# Patient Record
Sex: FEMALE | Race: WHITE | Employment: OTHER | URBAN - METROPOLITAN AREA
[De-identification: names, ages, dates, MRNs, and addresses within clinical notes are randomized per-mention and may not be internally consistent; named-entity substitution may affect disease eponyms.]

---

## 2018-06-19 ENCOUNTER — HOSPITAL ENCOUNTER (OUTPATIENT)
Age: 68
Discharge: HOME | End: 2018-06-21

## 2018-06-19 LAB
ANION GAP SERPL CALCULATED.3IONS-SCNC: 13 MMOL/L (ref 7–16)
BUN BLDV-MCNC: 10 MG/DL (ref 8–23)
CALCIUM SERPL-MCNC: 8.7 MG/DL (ref 8.6–10.2)
CHLORIDE BLD-SCNC: 101 MMOL/L (ref 98–107)
CO2: 26 MMOL/L (ref 22–29)
CREAT SERPL-MCNC: 0.7 MG/DL (ref 0.5–1)
GFR AFRICAN AMERICAN: >60
GFR NON-AFRICAN AMERICAN: >60 ML/MIN/1.73
GLUCOSE BLD-MCNC: 106 MG/DL (ref 74–109)
HCT VFR BLD CALC: 39.7 % (ref 34–48)
HEMOGLOBIN: 12.5 G/DL (ref 11.5–15.5)
MCH RBC QN AUTO: 28.7 PG (ref 26–35)
MCHC RBC AUTO-ENTMCNC: 31.5 % (ref 32–34.5)
MCV RBC AUTO: 91.3 FL (ref 80–99.9)
PDW BLD-RTO: 12.8 FL (ref 11.5–15)
PLATELET # BLD: 240 E9/L (ref 130–450)
PMV BLD AUTO: 11 FL (ref 7–12)
POTASSIUM SERPL-SCNC: 4 MMOL/L (ref 3.5–5)
RBC # BLD: 4.35 E12/L (ref 3.5–5.5)
SODIUM BLD-SCNC: 140 MMOL/L (ref 132–146)
WBC # BLD: 5.6 E9/L (ref 4.5–11.5)

## 2018-06-19 PROCEDURE — 80048 BASIC METABOLIC PNL TOTAL CA: CPT

## 2018-06-19 PROCEDURE — 87081 CULTURE SCREEN ONLY: CPT

## 2018-06-19 PROCEDURE — 85027 COMPLETE CBC AUTOMATED: CPT

## 2018-06-21 LAB — MRSA CULTURE ONLY: NORMAL

## 2019-06-10 ENCOUNTER — OFFICE VISIT (OUTPATIENT)
Dept: RHEUMATOLOGY | Age: 69
End: 2019-06-10
Payer: MEDICARE

## 2019-06-10 ENCOUNTER — HOSPITAL ENCOUNTER (OUTPATIENT)
Age: 69
Discharge: HOME | End: 2019-06-12
Payer: MEDICARE

## 2019-06-10 VITALS
BODY MASS INDEX: 34.72 KG/M2 | DIASTOLIC BLOOD PRESSURE: 74 MMHG | WEIGHT: 203.4 LBS | SYSTOLIC BLOOD PRESSURE: 124 MMHG | OXYGEN SATURATION: 96 % | HEIGHT: 64 IN | TEMPERATURE: 97.2 F | HEART RATE: 79 BPM

## 2019-06-10 DIAGNOSIS — M35.00 HX OF SJOGREN'S DISEASE (HCC): Primary | ICD-10-CM

## 2019-06-10 DIAGNOSIS — M35.00 HX OF SJOGREN'S DISEASE (HCC): ICD-10-CM

## 2019-06-10 DIAGNOSIS — M79.7 FIBROMYALGIA: ICD-10-CM

## 2019-06-10 LAB
ALBUMIN SERPL-MCNC: 4.5 G/DL (ref 3.5–5.2)
ALP BLD-CCNC: 88 U/L (ref 35–104)
ALT SERPL-CCNC: 13 U/L (ref 0–32)
ANION GAP SERPL CALCULATED.3IONS-SCNC: 18 MMOL/L (ref 7–16)
AST SERPL-CCNC: 15 U/L (ref 0–31)
BACTERIA: ABNORMAL /HPF
BASOPHILS ABSOLUTE: 0.03 E9/L (ref 0–0.2)
BASOPHILS RELATIVE PERCENT: 0.5 % (ref 0–2)
BILIRUB SERPL-MCNC: 0.3 MG/DL (ref 0–1.2)
BILIRUBIN URINE: NEGATIVE
BLOOD, URINE: ABNORMAL
BUN BLDV-MCNC: 12 MG/DL (ref 8–23)
C-REACTIVE PROTEIN: 0.4 MG/DL (ref 0–0.4)
C3 COMPLEMENT: 159 MG/DL (ref 90–180)
C4 COMPLEMENT: 25 MG/DL (ref 10–40)
CALCIUM SERPL-MCNC: 9.3 MG/DL (ref 8.6–10.2)
CHLORIDE BLD-SCNC: 103 MMOL/L (ref 98–107)
CLARITY: ABNORMAL
CO2: 21 MMOL/L (ref 22–29)
COLOR: YELLOW
CREAT SERPL-MCNC: 0.7 MG/DL (ref 0.5–1)
CREATININE URINE: 179 MG/DL (ref 29–226)
EOSINOPHILS ABSOLUTE: 0.06 E9/L (ref 0.05–0.5)
EOSINOPHILS RELATIVE PERCENT: 0.9 % (ref 0–6)
GFR AFRICAN AMERICAN: >60
GFR NON-AFRICAN AMERICAN: >60 ML/MIN/1.73
GLUCOSE BLD-MCNC: 99 MG/DL (ref 74–99)
GLUCOSE URINE: NEGATIVE MG/DL
HCT VFR BLD CALC: 42.5 % (ref 34–48)
HEMOGLOBIN: 13.3 G/DL (ref 11.5–15.5)
IMMATURE GRANULOCYTES #: 0.03 E9/L
IMMATURE GRANULOCYTES %: 0.5 % (ref 0–5)
KETONES, URINE: NEGATIVE MG/DL
LEUKOCYTE ESTERASE, URINE: ABNORMAL
LYMPHOCYTES ABSOLUTE: 1.46 E9/L (ref 1.5–4)
LYMPHOCYTES RELATIVE PERCENT: 22.4 % (ref 20–42)
MCH RBC QN AUTO: 28.1 PG (ref 26–35)
MCHC RBC AUTO-ENTMCNC: 31.3 % (ref 32–34.5)
MCV RBC AUTO: 89.7 FL (ref 80–99.9)
MONOCYTES ABSOLUTE: 0.35 E9/L (ref 0.1–0.95)
MONOCYTES RELATIVE PERCENT: 5.4 % (ref 2–12)
NEUTROPHILS ABSOLUTE: 4.58 E9/L (ref 1.8–7.3)
NEUTROPHILS RELATIVE PERCENT: 70.3 % (ref 43–80)
NITRITE, URINE: NEGATIVE
PDW BLD-RTO: 13.9 FL (ref 11.5–15)
PH UA: 5.5 (ref 5–9)
PLATELET # BLD: 246 E9/L (ref 130–450)
PMV BLD AUTO: 10.6 FL (ref 7–12)
POTASSIUM SERPL-SCNC: 4.1 MMOL/L (ref 3.5–5)
PROTEIN PROTEIN: 15 MG/DL (ref 0–12)
PROTEIN UA: NEGATIVE MG/DL
PROTEIN/CREAT RATIO: 0.1
PROTEIN/CREAT RATIO: 0.1 (ref 0–0.2)
RBC # BLD: 4.74 E12/L (ref 3.5–5.5)
RBC UA: ABNORMAL /HPF (ref 0–2)
RHEUMATOID FACTOR: <10 IU/ML (ref 0–13)
SEDIMENTATION RATE, ERYTHROCYTE: 15 MM/HR (ref 0–20)
SODIUM BLD-SCNC: 142 MMOL/L (ref 132–146)
SPECIFIC GRAVITY UA: 1.02 (ref 1–1.03)
TOTAL PROTEIN: 7.6 G/DL (ref 6.4–8.3)
URIC ACID, SERUM: 4.8 MG/DL (ref 2.4–5.7)
UROBILINOGEN, URINE: 0.2 E.U./DL
WBC # BLD: 6.5 E9/L (ref 4.5–11.5)
WBC UA: ABNORMAL /HPF (ref 0–5)

## 2019-06-10 PROCEDURE — 86140 C-REACTIVE PROTEIN: CPT

## 2019-06-10 PROCEDURE — 86704 HEP B CORE ANTIBODY TOTAL: CPT

## 2019-06-10 PROCEDURE — 84156 ASSAY OF PROTEIN URINE: CPT

## 2019-06-10 PROCEDURE — 36415 COLL VENOUS BLD VENIPUNCTURE: CPT

## 2019-06-10 PROCEDURE — 86803 HEPATITIS C AB TEST: CPT

## 2019-06-10 PROCEDURE — 86200 CCP ANTIBODY: CPT

## 2019-06-10 PROCEDURE — 86706 HEP B SURFACE ANTIBODY: CPT

## 2019-06-10 PROCEDURE — 86431 RHEUMATOID FACTOR QUANT: CPT

## 2019-06-10 PROCEDURE — 80053 COMPREHEN METABOLIC PANEL: CPT

## 2019-06-10 PROCEDURE — 86235 NUCLEAR ANTIGEN ANTIBODY: CPT

## 2019-06-10 PROCEDURE — 82570 ASSAY OF URINE CREATININE: CPT

## 2019-06-10 PROCEDURE — 99203 OFFICE O/P NEW LOW 30 MIN: CPT | Performed by: INTERNAL MEDICINE

## 2019-06-10 PROCEDURE — 81001 URINALYSIS AUTO W/SCOPE: CPT

## 2019-06-10 PROCEDURE — 86225 DNA ANTIBODY NATIVE: CPT

## 2019-06-10 PROCEDURE — 87340 HEPATITIS B SURFACE AG IA: CPT

## 2019-06-10 PROCEDURE — 86255 FLUORESCENT ANTIBODY SCREEN: CPT

## 2019-06-10 PROCEDURE — 85025 COMPLETE CBC W/AUTO DIFF WBC: CPT

## 2019-06-10 PROCEDURE — 84550 ASSAY OF BLOOD/URIC ACID: CPT

## 2019-06-10 PROCEDURE — 86160 COMPLEMENT ANTIGEN: CPT

## 2019-06-10 PROCEDURE — 86038 ANTINUCLEAR ANTIBODIES: CPT

## 2019-06-10 PROCEDURE — 85651 RBC SED RATE NONAUTOMATED: CPT

## 2019-06-10 RX ORDER — RANITIDINE 300 MG/1
300 TABLET ORAL NIGHTLY
COMMUNITY
End: 2022-01-04

## 2019-06-10 RX ORDER — ACETAMINOPHEN 500 MG
1000 TABLET ORAL EVERY 6 HOURS PRN
COMMUNITY

## 2019-06-10 RX ORDER — MELOXICAM 15 MG/1
15 TABLET ORAL DAILY PRN
Qty: 30 TABLET | Refills: 2 | Status: SHIPPED | OUTPATIENT
Start: 2019-06-10 | End: 2022-01-04

## 2019-06-10 RX ORDER — IBUPROFEN 200 MG
400 TABLET ORAL EVERY 6 HOURS PRN
COMMUNITY
End: 2022-01-04

## 2019-06-10 SDOH — HEALTH STABILITY: MENTAL HEALTH: HOW OFTEN DO YOU HAVE A DRINK CONTAINING ALCOHOL?: MONTHLY OR LESS

## 2019-06-10 SDOH — HEALTH STABILITY: MENTAL HEALTH: HOW MANY STANDARD DRINKS CONTAINING ALCOHOL DO YOU HAVE ON A TYPICAL DAY?: 1 OR 2

## 2019-06-10 ASSESSMENT — ENCOUNTER SYMPTOMS
COUGH: 0
ABDOMINAL PAIN: 0
CONSTIPATION: 0
SHORTNESS OF BREATH: 0
EYE ITCHING: 0
PHOTOPHOBIA: 0
EYE REDNESS: 0
WHEEZING: 0
CHEST TIGHTNESS: 0
EYE PAIN: 0
BACK PAIN: 1
DIARRHEA: 0
BLOOD IN STOOL: 0
SORE THROAT: 0
VOMITING: 0

## 2019-06-10 ASSESSMENT — ROUTINE ASSESSMENT OF PATIENT INDEX DATA (RAPID3)
ON A SCALE OF ONE TO TEN, CONSIDERING ALL THE WAYS IN WHICH ILLNESS AND HEALTH CONDITIONS MAY AFFECT YOU AT THIS TIME, PLEASE INDICATE BELOW HOW YOU ARE DOING:: 4
ON A SCALE OF ONE TO TEN, HOW MUCH PAIN HAVE YOU HAD BECAUSE OF YOUR CONDITION OVER THE PAST WEEK?: 7.5
TOTAL RAPID3 SCORE: 11.5
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
## 2019-06-11 LAB
ANTI-NUCLEAR ANTIBODY (ANA): NEGATIVE
HBV SURFACE AB TITR SER: NORMAL {TITER}
HEPATITIS B SURFACE ANTIGEN INTERPRETATION: NORMAL
HEPATITIS C ANTIBODY INTERPRETATION: NORMAL

## 2019-06-12 ENCOUNTER — TELEPHONE (OUTPATIENT)
Dept: RHEUMATOLOGY | Age: 69
End: 2019-06-12

## 2019-06-12 DIAGNOSIS — M35.00 SJOGREN'S SYNDROME, WITH UNSPECIFIED ORGAN INVOLVEMENT (HCC): Primary | ICD-10-CM

## 2019-06-12 LAB
ANTI DNA DOUBLE STRANDED: NEGATIVE
ANTI JO-1 IGG: NEGATIVE
ANTI-MITOCHONDRIAL AB, IFA: NEGATIVE
ENA TO RNP ANTIBODY: NEGATIVE
SCLERODERMA (SCL-70) AB: NEGATIVE

## 2019-06-12 NOTE — TELEPHONE ENCOUNTER
Can you put in an order for SSB lab? Apparently the panel they ran showed this as positive but they cannot enter the results without you entering in the test order.

## 2019-06-13 LAB
CCP IGG ANTIBODIES: 4 UNITS (ref 0–19)
ENA TO SSA (RO) ANTIBODY: NEGATIVE
ENA TO SSB (LA) ANTIBODY: POSITIVE
HEPATITIS B CORE TOTAL ANTIBODY: NONREACTIVE

## 2019-07-11 ENCOUNTER — OFFICE VISIT (OUTPATIENT)
Dept: RHEUMATOLOGY | Age: 69
End: 2019-07-11
Payer: MEDICARE

## 2019-07-11 VITALS
HEART RATE: 89 BPM | OXYGEN SATURATION: 95 % | WEIGHT: 201.4 LBS | DIASTOLIC BLOOD PRESSURE: 78 MMHG | BODY MASS INDEX: 33.55 KG/M2 | TEMPERATURE: 97.2 F | HEIGHT: 65 IN | SYSTOLIC BLOOD PRESSURE: 118 MMHG

## 2019-07-11 DIAGNOSIS — N30.00 ACUTE CYSTITIS WITHOUT HEMATURIA: Primary | ICD-10-CM

## 2019-07-11 PROCEDURE — 99214 OFFICE O/P EST MOD 30 MIN: CPT | Performed by: INTERNAL MEDICINE

## 2019-07-11 RX ORDER — NITROFURANTOIN 25; 75 MG/1; MG/1
100 CAPSULE ORAL 2 TIMES DAILY
Qty: 10 CAPSULE | Refills: 0 | Status: SHIPPED | OUTPATIENT
Start: 2019-07-11 | End: 2019-07-21

## 2019-07-11 ASSESSMENT — ENCOUNTER SYMPTOMS
BLOOD IN STOOL: 0
CHEST TIGHTNESS: 0
DIARRHEA: 0
WHEEZING: 0
CONSTIPATION: 0
ABDOMINAL PAIN: 0
VOMITING: 0
COUGH: 0
EYE REDNESS: 0
BACK PAIN: 1
EYE ITCHING: 0
PHOTOPHOBIA: 0
SORE THROAT: 0
EYE PAIN: 0
SHORTNESS OF BREATH: 0

## 2019-07-11 NOTE — PROGRESS NOTES
tolerated for 6 months due to GERD. Last Eye Exam: 2 years ago. Follows with Dr Jerad Bernardo. 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    Past Surgical History:   Procedure Laterality Date    HYSTERECTOMY        Social History     Socioeconomic History    Marital status:      Spouse name: Not on file    Number of children: Not on file    Years of education: Not on file    Highest education level: Not on file   Occupational History    Not on file   Social Needs    Financial resource strain: Not on file    Food insecurity:     Worry: Not on file     Inability: Not on file    Transportation needs:     Medical: Not on file     Non-medical: Not on file   Tobacco Use    Smoking status: Never Smoker    Smokeless tobacco: Never Used   Substance and Sexual Activity    Alcohol use: Yes     Frequency: Monthly or less     Drinks per session: 1 or 2     Binge frequency: Never     Comment: social    Drug use: Never    Sexual activity: Not Currently     Partners: Male     Birth control/protection: Post-menopausal   Lifestyle    Physical activity:     Days per week: Not on file     Minutes per session: Not on file    Stress: Not on file   Relationships    Social connections:     Talks on phone: Not on file     Gets together: Not on file     Attends Yazidism service: Not on file     Active member of club or organization: Not on file     Attends meetings of clubs or organizations: Not on file     Relationship status: Not on file    Intimate partner violence:     Fear of current or ex partner: Not on file     Emotionally visit:    1- Hx of Sjogren's disease  Manifested as sicca symptoms , + SSB antibody , no evidence of overlap disease,   _ recommend symptomatic management of Sicca symptoms. _ periodic monitoring of disease activity. 2- Fibromyalgia  Follow up with PCP for management of Fibromyalgia. 3- Cystitis:    Microbid was given. Recommend to follow up with PCP if symptoms do not get better. No follow-ups on file. Jannet Miller MD     *This document was created using voice recognition software. Note was reviewed, however may contain grammatical errors.

## 2019-12-03 ENCOUNTER — HOSPITAL ENCOUNTER (EMERGENCY)
Age: 69
Discharge: HOME | End: 2019-12-03
Attending: EMERGENCY MEDICINE
Payer: MEDICARE

## 2019-12-03 ENCOUNTER — APPOINTMENT (OUTPATIENT)
Dept: GENERAL RADIOLOGY | Age: 69
End: 2019-12-03
Payer: MEDICARE

## 2019-12-03 ENCOUNTER — APPOINTMENT (OUTPATIENT)
Dept: CT IMAGING | Age: 69
End: 2019-12-03
Payer: MEDICARE

## 2019-12-03 VITALS
OXYGEN SATURATION: 97 % | RESPIRATION RATE: 18 BRPM | HEART RATE: 82 BPM | SYSTOLIC BLOOD PRESSURE: 148 MMHG | WEIGHT: 188 LBS | DIASTOLIC BLOOD PRESSURE: 75 MMHG | HEIGHT: 65 IN | BODY MASS INDEX: 31.32 KG/M2 | TEMPERATURE: 98.6 F
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
## 2019-12-03 DIAGNOSIS — S09.90XA CLOSED HEAD INJURY, INITIAL ENCOUNTER: ICD-10-CM

## 2019-12-03 DIAGNOSIS — S62.524A CLOSED NONDISPLACED FRACTURE OF DISTAL PHALANX OF RIGHT THUMB, INITIAL ENCOUNTER: Primary | ICD-10-CM

## 2019-12-03 PROCEDURE — 73030 X-RAY EXAM OF SHOULDER: CPT

## 2019-12-03 PROCEDURE — 71046 X-RAY EXAM CHEST 2 VIEWS: CPT

## 2019-12-03 PROCEDURE — 70486 CT MAXILLOFACIAL W/O DYE: CPT

## 2019-12-03 PROCEDURE — 73130 X-RAY EXAM OF HAND: CPT

## 2019-12-03 PROCEDURE — 70450 CT HEAD/BRAIN W/O DYE: CPT

## 2019-12-03 PROCEDURE — 6370000000 HC RX 637 (ALT 250 FOR IP): Performed by: STUDENT IN AN ORGANIZED HEALTH CARE EDUCATION/TRAINING PROGRAM

## 2019-12-03 PROCEDURE — 72125 CT NECK SPINE W/O DYE: CPT

## 2019-12-03 PROCEDURE — 99284 EMERGENCY DEPT VISIT MOD MDM: CPT

## 2019-12-03 RX ORDER — ACETAMINOPHEN 500 MG
1000 TABLET ORAL ONCE
Status: COMPLETED | OUTPATIENT
Start: 2019-12-03 | End: 2019-12-03

## 2019-12-03 RX ADMIN — ACETAMINOPHEN 1000 MG: 500 TABLET ORAL at 13:35

## 2019-12-03 ASSESSMENT — ENCOUNTER SYMPTOMS
COUGH: 0
ABDOMINAL PAIN: 0
SHORTNESS OF BREATH: 0
COLOR CHANGE: 0
NAUSEA: 0
DIARRHEA: 0
WHEEZING: 0
CONSTIPATION: 0
EYE ITCHING: 0
FACIAL SWELLING: 1
EYE PAIN: 0
VOMITING: 0

## 2019-12-03 ASSESSMENT — PAIN DESCRIPTION - ONSET: ONSET: GRADUAL

## 2019-12-03 ASSESSMENT — PAIN - FUNCTIONAL ASSESSMENT: PAIN_FUNCTIONAL_ASSESSMENT: ACTIVITIES ARE NOT PREVENTED

## 2019-12-03 ASSESSMENT — PAIN SCALES - GENERAL
PAINLEVEL_OUTOF10: 9
PAINLEVEL_OUTOF10: 7

## 2019-12-03 ASSESSMENT — PAIN DESCRIPTION - PAIN TYPE: TYPE: ACUTE PAIN

## 2019-12-03 ASSESSMENT — PAIN DESCRIPTION - FREQUENCY: FREQUENCY: CONTINUOUS

## 2019-12-03 ASSESSMENT — PAIN DESCRIPTION - LOCATION: LOCATION: HEAD

## 2019-12-03 ASSESSMENT — PAIN DESCRIPTION - DESCRIPTORS: DESCRIPTORS: ACHING

## 2021-02-04 ENCOUNTER — IMMUNIZATION (OUTPATIENT)
Dept: PRIMARY CARE CLINIC | Age: 71
End: 2021-02-04
Payer: MEDICARE

## 2021-02-04 DIAGNOSIS — Z23 ENCOUNTER FOR IMMUNIZATION: ICD-10-CM

## 2021-02-04 PROCEDURE — 91301 COVID-19, MODERNA VACCINE 100MCG/0.5ML DOSE: CPT | Performed by: NURSE PRACTITIONER

## 2021-02-04 PROCEDURE — 0011A COVID-19, MODERNA VACCINE 100MCG/0.5ML DOSE: CPT | Performed by: NURSE PRACTITIONER

## 2021-03-04 ENCOUNTER — IMMUNIZATION (OUTPATIENT)
Dept: PRIMARY CARE CLINIC | Age: 71
End: 2021-03-04
Payer: MEDICARE

## 2021-03-04 PROCEDURE — 0012A COVID-19, MODERNA VACCINE 100MCG/0.5ML DOSE: CPT | Performed by: NURSE PRACTITIONER

## 2021-03-04 PROCEDURE — 91301 COVID-19, MODERNA VACCINE 100MCG/0.5ML DOSE: CPT | Performed by: NURSE PRACTITIONER

## 2021-06-16 ENCOUNTER — HOSPITAL ENCOUNTER (OUTPATIENT)
Age: 71
Discharge: HOME | End: 2021-06-18

## 2021-06-16 PROCEDURE — 88360 TUMOR IMMUNOHISTOCHEM/MANUAL: CPT

## 2021-06-16 PROCEDURE — 88341 IMHCHEM/IMCYTCHM EA ADD ANTB: CPT

## 2021-06-16 PROCEDURE — 88305 TISSUE EXAM BY PATHOLOGIST: CPT

## 2021-06-16 PROCEDURE — 88342 IMHCHEM/IMCYTCHM 1ST ANTB: CPT

## 2021-06-25 ENCOUNTER — TELEPHONE (OUTPATIENT)
Dept: BREAST CENTER | Age: 71
End: 2021-06-25

## 2021-06-25 NOTE — TELEPHONE ENCOUNTER
Patient has been referred to our office -- attempted to contact patient to schedule appointment - left message on voice mail. Appointment is ready to be scheduled under the waiting list tab.

## 2021-07-07 ENCOUNTER — TELEPHONE (OUTPATIENT)
Dept: BREAST CENTER | Age: 71
End: 2021-07-07

## 2021-07-07 NOTE — TELEPHONE ENCOUNTER
Followed up with new referral in reference to scheduling a consultation in the breast clinic for her recent breast cancer diagnosis. She recently saw an oncologist who sent her to another breast surgeon closer to her location. She is going to disregard our referral at this time. Will update referring office of Dr. Jennifer Ramirez.

## 2021-09-23 ENCOUNTER — HOSPITAL ENCOUNTER (OUTPATIENT)
Age: 71
Setting detail: SPECIMEN
Discharge: HOME | End: 2021-09-23
Payer: MEDICARE

## 2021-09-24 ENCOUNTER — HOSPITAL ENCOUNTER (OUTPATIENT)
Age: 71
Setting detail: SPECIMEN
Discharge: HOME | End: 2021-09-24
Payer: MEDICARE

## 2021-09-24 LAB
BACTERIA: ABNORMAL /HPF
BILIRUBIN URINE: NEGATIVE
BLOOD, URINE: ABNORMAL
CLARITY: CLEAR
COLOR: YELLOW
GLUCOSE URINE: NEGATIVE MG/DL
KETONES, URINE: NEGATIVE MG/DL
LEUKOCYTE ESTERASE, URINE: ABNORMAL
NITRITE, URINE: NEGATIVE
PH UA: 6 (ref 5–9)
PROTEIN UA: NEGATIVE MG/DL
RBC UA: ABNORMAL /HPF (ref 0–2)
SPECIFIC GRAVITY UA: 1.02 (ref 1–1.03)
UROBILINOGEN, URINE: 0.2 E.U./DL
WBC UA: ABNORMAL /HPF (ref 0–5)

## 2021-09-24 PROCEDURE — 87088 URINE BACTERIA CULTURE: CPT

## 2021-09-24 PROCEDURE — 81001 URINALYSIS AUTO W/SCOPE: CPT

## 2021-09-27 LAB — URINE CULTURE, ROUTINE: NORMAL

## 2021-12-14 ENCOUNTER — APPOINTMENT (OUTPATIENT)
Dept: CT IMAGING | Age: 71
DRG: 175 | End: 2021-12-14
Payer: MEDICARE

## 2021-12-14 ENCOUNTER — HOSPITAL ENCOUNTER (INPATIENT)
Age: 71
LOS: 1 days | Discharge: HOME | DRG: 175 | End: 2021-12-16
Attending: EMERGENCY MEDICINE | Admitting: INTERNAL MEDICINE
Payer: MEDICARE

## 2021-12-14 ENCOUNTER — APPOINTMENT (OUTPATIENT)
Dept: GENERAL RADIOLOGY | Age: 71
DRG: 175 | End: 2021-12-14
Payer: MEDICARE

## 2021-12-14 DIAGNOSIS — J96.01 ACUTE RESPIRATORY FAILURE WITH HYPOXIA (HCC): Primary | ICD-10-CM

## 2021-12-14 DIAGNOSIS — R55 SYNCOPE AND COLLAPSE: ICD-10-CM

## 2021-12-14 LAB
ALBUMIN SERPL-MCNC: 3.8 G/DL (ref 3.5–5.2)
ALP BLD-CCNC: 91 U/L (ref 35–104)
ALT SERPL-CCNC: 12 U/L (ref 0–32)
ANION GAP SERPL CALCULATED.3IONS-SCNC: 12 MMOL/L (ref 7–16)
ANISOCYTOSIS: ABNORMAL
AST SERPL-CCNC: 16 U/L (ref 0–31)
BASOPHILS ABSOLUTE: 0.01 E9/L (ref 0–0.2)
BASOPHILS RELATIVE PERCENT: 0.3 % (ref 0–2)
BILIRUB SERPL-MCNC: 0.5 MG/DL (ref 0–1.2)
BUN BLDV-MCNC: 9 MG/DL (ref 6–23)
CALCIUM SERPL-MCNC: 9 MG/DL (ref 8.6–10.2)
CHLORIDE BLD-SCNC: 98 MMOL/L (ref 98–107)
CO2: 26 MMOL/L (ref 22–29)
CREAT SERPL-MCNC: 0.7 MG/DL (ref 0.5–1)
D DIMER: 4100 NG/ML DDU
EOSINOPHILS ABSOLUTE: 0.02 E9/L (ref 0.05–0.5)
EOSINOPHILS RELATIVE PERCENT: 0.5 % (ref 0–6)
GFR AFRICAN AMERICAN: >60
GFR NON-AFRICAN AMERICAN: >60 ML/MIN/1.73
GLUCOSE BLD-MCNC: 124 MG/DL (ref 74–99)
HCT VFR BLD CALC: 37.1 % (ref 34–48)
HEMOGLOBIN: 11.6 G/DL (ref 11.5–15.5)
IMMATURE GRANULOCYTES #: 0.04 E9/L
IMMATURE GRANULOCYTES %: 1.1 % (ref 0–5)
LYMPHOCYTES ABSOLUTE: 0.3 E9/L (ref 1.5–4)
LYMPHOCYTES RELATIVE PERCENT: 7.9 % (ref 20–42)
MCH RBC QN AUTO: 28.9 PG (ref 26–35)
MCHC RBC AUTO-ENTMCNC: 31.3 % (ref 32–34.5)
MCV RBC AUTO: 92.5 FL (ref 80–99.9)
MONOCYTES ABSOLUTE: 0.14 E9/L (ref 0.1–0.95)
MONOCYTES RELATIVE PERCENT: 3.7 % (ref 2–12)
NEUTROPHILS ABSOLUTE: 3.29 E9/L (ref 1.8–7.3)
NEUTROPHILS RELATIVE PERCENT: 86.5 % (ref 43–80)
OVALOCYTES: ABNORMAL
PDW BLD-RTO: 15.4 FL (ref 11.5–15)
PLATELET # BLD: 175 E9/L (ref 130–450)
PMV BLD AUTO: 11 FL (ref 7–12)
POIKILOCYTES: ABNORMAL
POTASSIUM REFLEX MAGNESIUM: 4.1 MMOL/L (ref 3.5–5)
RBC # BLD: 4.01 E12/L (ref 3.5–5.5)
SARS-COV-2, NAAT: NOT DETECTED
SODIUM BLD-SCNC: 136 MMOL/L (ref 132–146)
TEAR DROP CELLS: ABNORMAL
TOTAL PROTEIN: 6.7 G/DL (ref 6.4–8.3)
TROPONIN, HIGH SENSITIVITY: 24 NG/L (ref 0–9)
WBC # BLD: 3.8 E9/L (ref 4.5–11.5)

## 2021-12-14 PROCEDURE — 85378 FIBRIN DEGRADE SEMIQUANT: CPT

## 2021-12-14 PROCEDURE — 87635 SARS-COV-2 COVID-19 AMP PRB: CPT

## 2021-12-14 PROCEDURE — 93005 ELECTROCARDIOGRAM TRACING: CPT | Performed by: STUDENT IN AN ORGANIZED HEALTH CARE EDUCATION/TRAINING PROGRAM

## 2021-12-14 PROCEDURE — 80053 COMPREHEN METABOLIC PANEL: CPT

## 2021-12-14 PROCEDURE — 36415 COLL VENOUS BLD VENIPUNCTURE: CPT

## 2021-12-14 PROCEDURE — 83880 ASSAY OF NATRIURETIC PEPTIDE: CPT

## 2021-12-14 PROCEDURE — 84484 ASSAY OF TROPONIN QUANT: CPT

## 2021-12-14 PROCEDURE — 70450 CT HEAD/BRAIN W/O DYE: CPT

## 2021-12-14 PROCEDURE — 2580000003 HC RX 258: Performed by: STUDENT IN AN ORGANIZED HEALTH CARE EDUCATION/TRAINING PROGRAM

## 2021-12-14 PROCEDURE — 85025 COMPLETE CBC W/AUTO DIFF WBC: CPT

## 2021-12-14 PROCEDURE — 71045 X-RAY EXAM CHEST 1 VIEW: CPT

## 2021-12-14 PROCEDURE — 99285 EMERGENCY DEPT VISIT HI MDM: CPT

## 2021-12-14 RX ORDER — 0.9 % SODIUM CHLORIDE 0.9 %
1000 INTRAVENOUS SOLUTION INTRAVENOUS ONCE
Status: COMPLETED | OUTPATIENT
Start: 2021-12-14 | End: 2021-12-14

## 2021-12-14 RX ORDER — ONDANSETRON 2 MG/ML
4 INJECTION INTRAMUSCULAR; INTRAVENOUS ONCE
Status: COMPLETED | OUTPATIENT
Start: 2021-12-14 | End: 2021-12-15

## 2021-12-14 RX ADMIN — SODIUM CHLORIDE 1000 ML: 9 INJECTION, SOLUTION INTRAVENOUS at 21:59

## 2021-12-14 ASSESSMENT — ENCOUNTER SYMPTOMS
BACK PAIN: 0
COUGH: 0
SINUS PRESSURE: 0
EYE DISCHARGE: 0
EYE REDNESS: 0
SHORTNESS OF BREATH: 1
VOMITING: 0
EYE PAIN: 0
DIARRHEA: 0
ABDOMINAL DISTENTION: 0
SORE THROAT: 0
NAUSEA: 1
WHEEZING: 0

## 2021-12-15 ENCOUNTER — APPOINTMENT (OUTPATIENT)
Dept: NUCLEAR MEDICINE | Age: 71
DRG: 175 | End: 2021-12-15
Payer: MEDICARE

## 2021-12-15 PROBLEM — F32.A DEPRESSION: Status: ACTIVE | Noted: 2021-12-15

## 2021-12-15 PROBLEM — J96.01 ACUTE RESPIRATORY FAILURE WITH HYPOXIA (HCC): Status: ACTIVE | Noted: 2021-12-15

## 2021-12-15 PROBLEM — M79.7 FIBROMYALGIA: Status: ACTIVE | Noted: 2021-12-15

## 2021-12-15 PROBLEM — K58.9 IBS (IRRITABLE BOWEL SYNDROME): Status: ACTIVE | Noted: 2021-12-15

## 2021-12-15 PROBLEM — M35.00 SJOGREN'S DISEASE (HCC): Status: ACTIVE | Noted: 2021-12-15

## 2021-12-15 LAB
ALBUMIN SERPL-MCNC: 2.8 G/DL (ref 3.5–5.2)
ALP BLD-CCNC: 75 U/L (ref 35–104)
ALT SERPL-CCNC: 8 U/L (ref 0–32)
ANION GAP SERPL CALCULATED.3IONS-SCNC: 13 MMOL/L (ref 7–16)
ANISOCYTOSIS: ABNORMAL
AST SERPL-CCNC: 13 U/L (ref 0–31)
ATYPICAL LYMPHOCYTE RELATIVE PERCENT: 0.9 % (ref 0–4)
BASOPHILS ABSOLUTE: 0.02 E9/L (ref 0–0.2)
BASOPHILS RELATIVE PERCENT: 0.8 % (ref 0–2)
BILIRUB SERPL-MCNC: 0.3 MG/DL (ref 0–1.2)
BUN BLDV-MCNC: 8 MG/DL (ref 6–23)
CALCIUM SERPL-MCNC: 7.1 MG/DL (ref 8.6–10.2)
CHLORIDE BLD-SCNC: 109 MMOL/L (ref 98–107)
CO2: 19 MMOL/L (ref 22–29)
CREAT SERPL-MCNC: 0.5 MG/DL (ref 0.5–1)
EKG ATRIAL RATE: 99 BPM
EKG P AXIS: 54 DEGREES
EKG P-R INTERVAL: 152 MS
EKG Q-T INTERVAL: 366 MS
EKG QRS DURATION: 70 MS
EKG QTC CALCULATION (BAZETT): 469 MS
EKG R AXIS: 27 DEGREES
EKG T AXIS: 21 DEGREES
EKG VENTRICULAR RATE: 99 BPM
EOSINOPHILS ABSOLUTE: 0 E9/L (ref 0.05–0.5)
EOSINOPHILS RELATIVE PERCENT: 0 % (ref 0–6)
GFR AFRICAN AMERICAN: >60
GFR NON-AFRICAN AMERICAN: >60 ML/MIN/1.73
GLUCOSE BLD-MCNC: 120 MG/DL (ref 74–99)
HCT VFR BLD CALC: 33.4 % (ref 34–48)
HEMOGLOBIN: 10.5 G/DL (ref 11.5–15.5)
LV EF: 60 %
LVEF MODALITY: NORMAL
LYMPHOCYTES ABSOLUTE: 0.58 E9/L (ref 1.5–4)
LYMPHOCYTES RELATIVE PERCENT: 19.1 % (ref 20–42)
MAGNESIUM: 1.9 MG/DL (ref 1.6–2.6)
MCH RBC QN AUTO: 29.7 PG (ref 26–35)
MCHC RBC AUTO-ENTMCNC: 31.4 % (ref 32–34.5)
MCV RBC AUTO: 94.4 FL (ref 80–99.9)
METAMYELOCYTES RELATIVE PERCENT: 0.9 % (ref 0–1)
MONOCYTES ABSOLUTE: 0.09 E9/L (ref 0.1–0.95)
MONOCYTES RELATIVE PERCENT: 2.6 % (ref 2–12)
MYELOCYTE PERCENT: 0.9 % (ref 0–0)
NEUTROPHILS ABSOLUTE: 2.2 E9/L (ref 1.8–7.3)
NEUTROPHILS RELATIVE PERCENT: 73.9 % (ref 43–80)
NUCLEATED RED BLOOD CELLS: 0 /100 WBC
OVALOCYTES: ABNORMAL
PDW BLD-RTO: 15.5 FL (ref 11.5–15)
PLATELET # BLD: 144 E9/L (ref 130–450)
PMV BLD AUTO: 10.8 FL (ref 7–12)
POIKILOCYTES: ABNORMAL
POTASSIUM REFLEX MAGNESIUM: 3.5 MMOL/L (ref 3.5–5)
PRO-BNP: 1680 PG/ML (ref 0–125)
PRO-BNP: 1966 PG/ML (ref 0–125)
PROMYELOCYTES PERCENT: 0.9 % (ref 0–0)
RBC # BLD: 3.54 E12/L (ref 3.5–5.5)
REASON FOR REJECTION: NORMAL
REASON FOR REJECTION: NORMAL
REJECTED TEST: NORMAL
REJECTED TEST: NORMAL
SODIUM BLD-SCNC: 141 MMOL/L (ref 132–146)
TOTAL PROTEIN: 5.3 G/DL (ref 6.4–8.3)
TROPONIN, HIGH SENSITIVITY: 18 NG/L (ref 0–9)
TROPONIN, HIGH SENSITIVITY: 19 NG/L (ref 0–9)
WBC # BLD: 2.9 E9/L (ref 4.5–11.5)

## 2021-12-15 PROCEDURE — 83735 ASSAY OF MAGNESIUM: CPT

## 2021-12-15 PROCEDURE — A9540 TC99M MAA: HCPCS | Performed by: RADIOLOGY

## 2021-12-15 PROCEDURE — A9558 XE133 XENON 10MCI: HCPCS | Performed by: RADIOLOGY

## 2021-12-15 PROCEDURE — 83880 ASSAY OF NATRIURETIC PEPTIDE: CPT

## 2021-12-15 PROCEDURE — 78582 LUNG VENTILAT&PERFUS IMAGING: CPT

## 2021-12-15 PROCEDURE — 6360000002 HC RX W HCPCS: Performed by: STUDENT IN AN ORGANIZED HEALTH CARE EDUCATION/TRAINING PROGRAM

## 2021-12-15 PROCEDURE — 93010 ELECTROCARDIOGRAM REPORT: CPT | Performed by: INTERNAL MEDICINE

## 2021-12-15 PROCEDURE — 6360000002 HC RX W HCPCS: Performed by: INTERNAL MEDICINE

## 2021-12-15 PROCEDURE — 6370000000 HC RX 637 (ALT 250 FOR IP): Performed by: INTERNAL MEDICINE

## 2021-12-15 PROCEDURE — 6370000000 HC RX 637 (ALT 250 FOR IP): Performed by: STUDENT IN AN ORGANIZED HEALTH CARE EDUCATION/TRAINING PROGRAM

## 2021-12-15 PROCEDURE — 2060000000 HC ICU INTERMEDIATE R&B

## 2021-12-15 PROCEDURE — 85025 COMPLETE CBC W/AUTO DIFF WBC: CPT

## 2021-12-15 PROCEDURE — 80053 COMPREHEN METABOLIC PANEL: CPT

## 2021-12-15 PROCEDURE — 3430000000 HC RX DIAGNOSTIC RADIOPHARMACEUTICAL: Performed by: RADIOLOGY

## 2021-12-15 PROCEDURE — 93306 TTE W/DOPPLER COMPLETE: CPT

## 2021-12-15 PROCEDURE — 84484 ASSAY OF TROPONIN QUANT: CPT

## 2021-12-15 PROCEDURE — 2700000000 HC OXYGEN THERAPY PER DAY

## 2021-12-15 PROCEDURE — 36415 COLL VENOUS BLD VENIPUNCTURE: CPT

## 2021-12-15 PROCEDURE — 2580000003 HC RX 258: Performed by: INTERNAL MEDICINE

## 2021-12-15 PROCEDURE — 97161 PT EVAL LOW COMPLEX 20 MIN: CPT

## 2021-12-15 RX ORDER — SODIUM CHLORIDE 0.9 % (FLUSH) 0.9 %
10 SYRINGE (ML) INJECTION EVERY 12 HOURS SCHEDULED
Status: DISCONTINUED | OUTPATIENT
Start: 2021-12-15 | End: 2021-12-16 | Stop reason: HOSPADM

## 2021-12-15 RX ORDER — XENON XE-133 10 MCI/1
10 GAS RESPIRATORY (INHALATION)
Status: COMPLETED | OUTPATIENT
Start: 2021-12-15 | End: 2021-12-15

## 2021-12-15 RX ORDER — ACETAMINOPHEN 650 MG/1
650 SUPPOSITORY RECTAL EVERY 6 HOURS PRN
Status: DISCONTINUED | OUTPATIENT
Start: 2021-12-15 | End: 2021-12-16 | Stop reason: HOSPADM

## 2021-12-15 RX ORDER — ACETAMINOPHEN 325 MG/1
650 TABLET ORAL EVERY 6 HOURS PRN
Status: DISCONTINUED | OUTPATIENT
Start: 2021-12-15 | End: 2021-12-16 | Stop reason: HOSPADM

## 2021-12-15 RX ORDER — POTASSIUM CHLORIDE 7.45 MG/ML
10 INJECTION INTRAVENOUS PRN
Status: DISCONTINUED | OUTPATIENT
Start: 2021-12-15 | End: 2021-12-16 | Stop reason: HOSPADM

## 2021-12-15 RX ORDER — LANSOPRAZOLE 15 MG/1
15 CAPSULE, DELAYED RELEASE ORAL DAILY
Status: DISCONTINUED | OUTPATIENT
Start: 2021-12-15 | End: 2021-12-15 | Stop reason: CLARIF

## 2021-12-15 RX ORDER — ONDANSETRON 4 MG/1
4 TABLET, FILM COATED ORAL EVERY 8 HOURS PRN
COMMUNITY
End: 2022-06-08 | Stop reason: ALTCHOICE

## 2021-12-15 RX ORDER — DULOXETIN HYDROCHLORIDE 30 MG/1
1 CAPSULE, DELAYED RELEASE ORAL DAILY
COMMUNITY

## 2021-12-15 RX ORDER — CYCLOBENZAPRINE HCL 10 MG
10 TABLET ORAL 3 TIMES DAILY PRN
Status: DISCONTINUED | OUTPATIENT
Start: 2021-12-15 | End: 2021-12-16 | Stop reason: HOSPADM

## 2021-12-15 RX ORDER — DULOXETIN HYDROCHLORIDE 20 MG/1
20 CAPSULE, DELAYED RELEASE ORAL DAILY
Status: DISCONTINUED | OUTPATIENT
Start: 2021-12-15 | End: 2021-12-15

## 2021-12-15 RX ORDER — ONDANSETRON 4 MG/1
4 TABLET, ORALLY DISINTEGRATING ORAL EVERY 8 HOURS PRN
Status: DISCONTINUED | OUTPATIENT
Start: 2021-12-15 | End: 2021-12-16 | Stop reason: HOSPADM

## 2021-12-15 RX ORDER — SERTRALINE HYDROCHLORIDE 100 MG/1
100 TABLET, FILM COATED ORAL DAILY
Status: DISCONTINUED | OUTPATIENT
Start: 2021-12-15 | End: 2021-12-16 | Stop reason: HOSPADM

## 2021-12-15 RX ORDER — TRAZODONE HYDROCHLORIDE 50 MG/1
50 TABLET ORAL NIGHTLY
Status: DISCONTINUED | OUTPATIENT
Start: 2021-12-15 | End: 2021-12-16 | Stop reason: HOSPADM

## 2021-12-15 RX ORDER — DULOXETIN HYDROCHLORIDE 30 MG/1
30 CAPSULE, DELAYED RELEASE ORAL DAILY
Status: DISCONTINUED | OUTPATIENT
Start: 2021-12-15 | End: 2021-12-16 | Stop reason: HOSPADM

## 2021-12-15 RX ORDER — PANTOPRAZOLE SODIUM 40 MG/1
40 TABLET, DELAYED RELEASE ORAL
Status: DISCONTINUED | OUTPATIENT
Start: 2021-12-15 | End: 2021-12-16 | Stop reason: HOSPADM

## 2021-12-15 RX ORDER — SENNA PLUS 8.6 MG/1
1 TABLET ORAL DAILY PRN
Status: DISCONTINUED | OUTPATIENT
Start: 2021-12-15 | End: 2021-12-16 | Stop reason: HOSPADM

## 2021-12-15 RX ORDER — FAMOTIDINE 20 MG/1
40 TABLET, FILM COATED ORAL NIGHTLY
Status: DISCONTINUED | OUTPATIENT
Start: 2021-12-15 | End: 2021-12-16 | Stop reason: HOSPADM

## 2021-12-15 RX ORDER — ONDANSETRON 2 MG/ML
4 INJECTION INTRAMUSCULAR; INTRAVENOUS EVERY 6 HOURS PRN
Status: DISCONTINUED | OUTPATIENT
Start: 2021-12-15 | End: 2021-12-16 | Stop reason: HOSPADM

## 2021-12-15 RX ORDER — SODIUM CHLORIDE 9 MG/ML
25 INJECTION, SOLUTION INTRAVENOUS PRN
Status: DISCONTINUED | OUTPATIENT
Start: 2021-12-15 | End: 2021-12-16 | Stop reason: HOSPADM

## 2021-12-15 RX ORDER — POTASSIUM CHLORIDE 20 MEQ/1
40 TABLET, EXTENDED RELEASE ORAL PRN
Status: DISCONTINUED | OUTPATIENT
Start: 2021-12-15 | End: 2021-12-16 | Stop reason: HOSPADM

## 2021-12-15 RX ORDER — SODIUM CHLORIDE 0.9 % (FLUSH) 0.9 %
10 SYRINGE (ML) INJECTION PRN
Status: DISCONTINUED | OUTPATIENT
Start: 2021-12-15 | End: 2021-12-16 | Stop reason: HOSPADM

## 2021-12-15 RX ORDER — RANITIDINE 150 MG/1
300 TABLET ORAL NIGHTLY
Status: DISCONTINUED | OUTPATIENT
Start: 2021-12-15 | End: 2021-12-15 | Stop reason: CLARIF

## 2021-12-15 RX ADMIN — ENOXAPARIN SODIUM 80 MG: 100 INJECTION SUBCUTANEOUS at 08:46

## 2021-12-15 RX ADMIN — ONDANSETRON 4 MG: 2 INJECTION INTRAMUSCULAR; INTRAVENOUS at 00:56

## 2021-12-15 RX ADMIN — DULOXETINE HYDROCHLORIDE 30 MG: 60 CAPSULE, DELAYED RELEASE ORAL at 09:06

## 2021-12-15 RX ADMIN — FAMOTIDINE 40 MG: 20 TABLET ORAL at 20:52

## 2021-12-15 RX ADMIN — ENOXAPARIN SODIUM 80 MG: 100 INJECTION SUBCUTANEOUS at 20:52

## 2021-12-15 RX ADMIN — SODIUM CHLORIDE, PRESERVATIVE FREE 10 ML: 5 INJECTION INTRAVENOUS at 20:56

## 2021-12-15 RX ADMIN — FAMOTIDINE 40 MG: 20 TABLET ORAL at 00:56

## 2021-12-15 RX ADMIN — PANTOPRAZOLE SODIUM 40 MG: 40 TABLET, DELAYED RELEASE ORAL at 06:37

## 2021-12-15 RX ADMIN — ACETAMINOPHEN 650 MG: 325 TABLET ORAL at 20:52

## 2021-12-15 RX ADMIN — XENON XE-133 10 MILLICURIE: 10 GAS RESPIRATORY (INHALATION) at 09:40

## 2021-12-15 RX ADMIN — SERTRALINE 100 MG: 100 TABLET, FILM COATED ORAL at 08:46

## 2021-12-15 RX ADMIN — SODIUM CHLORIDE, PRESERVATIVE FREE 10 ML: 5 INJECTION INTRAVENOUS at 08:46

## 2021-12-15 RX ADMIN — ENOXAPARIN SODIUM 80 MG: 100 INJECTION SUBCUTANEOUS at 00:55

## 2021-12-15 RX ADMIN — Medication 6 MILLICURIE: at 09:40

## 2021-12-15 ASSESSMENT — PAIN SCALES - GENERAL
PAINLEVEL_OUTOF10: 2
PAINLEVEL_OUTOF10: 0
PAINLEVEL_OUTOF10: 9
PAINLEVEL_OUTOF10: 0

## 2021-12-15 ASSESSMENT — PAIN DESCRIPTION - PAIN TYPE: TYPE: ACUTE PAIN

## 2021-12-15 ASSESSMENT — PAIN DESCRIPTION - LOCATION
LOCATION: HEAD
LOCATION: HEAD

## 2021-12-15 ASSESSMENT — PAIN DESCRIPTION - DESCRIPTORS: DESCRIPTORS: HEADACHE

## 2021-12-15 NOTE — ED NOTES
Bed: 01  Expected date:   Expected time:   Means of arrival:   Comments:  350 Oziel Elkins RN  12/14/21 2027

## 2021-12-15 NOTE — PLAN OF CARE
MD Larson notified of orthostatic vitals. Orthostatic vitals: 130/61 HR 86-lying, 125/67 -sitting, 120/58 -stand.  Thanks

## 2021-12-15 NOTE — ED NOTES
Faxed [phone] room. confirmed with [facility] sbar received.  Room dirty- will transport when clean     Navid Espana RN  12/15/21 0037

## 2021-12-15 NOTE — ED NOTES
Decrease oxygen to see patient stat at rest. destated to 87%. Applied oxygen nc back, rebounded to 95%. Does not use home oxygen. Denies sob.      Chang De Leon RN  12/14/21 0451

## 2021-12-15 NOTE — H&P
Internal Medicine History & Physical     Name: Ronn Reese  : 1950  Chief Complaint: Loss of Consciousness (was at dinner at the [employer] with family, family states syncope eposide, hit head on table. no blood thinners)  Primary Care Physician: Arlin Taveras MD  Admission date: 2021  Date of service: 12/15/2021     History of Present Illness  Amanda Angel is a 70y.o. year old female with a PMHx of  Fibromyalgia, IBS, Sjogren's, breast cancer (on chemo since Sept every TH, last treatment on ) of who presented with a chief complaint of syncope. Patient states she went to the restaurant yesterday and did not feel so well, she tried to leave and while trying to get up but while still siting she had an episode of LOC. This was witnessed by family who denies any seizure like activities. Patient regain conscioussness shortly after and denies any postictal state. Symptoms associated with nausea but denies any vomiting. Patient describes symptoms moderate in severity, sudden with no alleviating or exacerbating factors. Patient denies any other symptoms including fever/chills, cp, sob, palpitations, leg swelling, abd pain, diarrhea or constipation. Patient is vaccinated      In the ED, Patient was tachycardic, hypoxic and placed on 3L NC, she was given Zofran and fluids. Labs remarkable for leukolymphocytopenia but was otherwise unremarkable without anemia or leukocytosis. CMP was unremarkable. D-dimer was elevated at 4100. Patient does have an anaphylactic allergy to iodine. VQ scan was ordered and is pending. Initial troponin was 24. Repeat was 19. Covid was negative. Chest x-ray did not show any acute cardiopulmonary processes. CT of the head was without any acute intracranial abnormalities. At this time however patient continues to require oxygen to meet her demands. VQ scan this morning positive for PE.     The patient was admitted for PE         Past Medical History:   Diagnosis Date    Depression     Fibromyalgia     IBS (irritable bowel syndrome)     [facility]ogren's [facility] ([facility] [address]     Thoracic compression fracture ([facility] [address]     T12       Past Surgical History:   Procedure Laterality Date    HYSTERECTOMY         History reviewed. No pertinent family history. Social History  Patient lives at home with    At baseline patient ambulates without assistance  Illicit drugs: Denies   TOBACCO:   reports that she has never smoked. She has never used smokeless tobacco.  ETOH:   reports current alcohol use. Home Medications  Prior to Admission medications    Medication Sig Start Date End Date Taking? Authorizing Provider   DULoxetine (CYMBALTA) 30 MG extended release capsule Take 1 capsule by mouth daily   Yes Historical Provider, MD   ondansetron (ZOFRAN) 4 MG tablet Take 4 mg by mouth every 8 hours as needed for Nausea or Vomiting   Yes Historical Provider, MD   acetaminophen (TYLENOL) 500 MG tablet Take 1,000 mg by mouth every 6 hours as needed for Pain   Yes Historical Provider, MD   lansoprazole (PREVACID) 15 MG delayed release capsule Take 15 mg by mouth daily   Yes Historical Provider, MD   traZODone (DESYREL) 50 MG tablet Take 50 mg by mouth nightly   Yes Historical Provider, MD   ibuprofen (ADVIL) 200 MG tablet Take 400 mg by mouth every 6 hours as needed for Pain    Historical Provider, MD   ranitidine (ZANTAC) 300 MG tablet Take 300 mg by mouth nightly    Historical Provider, MD   meloxicam (MOBIC) 15 MG tablet Take 1 tablet by mouth daily as needed for Pain 6/10/19   Vale RidMD vee   cyclobenzaprine (FLEXERIL) 10 MG tablet Take 10 mg by mouth 3 times daily as needed for Muscle spasms    Historical Provider, MD   sertraline (ZOLOFT) 100 MG tablet Take 100 mg by mouth daily    Historical Provider, MD       Allergies  Allergies   Allergen Reactions    Iodides Anaphylaxis       Review of Systems  Please see HPI above. All bolded are positive. All un-bolded are negative.   Constitutional no lower extremity edema, extremities atraumatic, no cyanosis, no clubbing, 2+ pedal pulses palpated  Skin: normal color, normal texture, normal turgor, no rashes, no lesions  Neurologic:5/5 muscle strength throughout, normal muscle tone throughout, face symmetric, hearing intact, tongue midline, speech appropriate without slurring, sensation to fine touch intact in upper and lower extremities    Labs-   Lab Results   Component Value Date    WBC 2.9 (L) 12/15/2021    HGB 10.5 (L) 12/15/2021    HCT 33.4 (L) 12/15/2021     12/15/2021     12/15/2021    K 3.5 12/15/2021     (H) 12/15/2021    CREATININE 0.5 12/15/2021    BUN 8 12/15/2021    CO2 19 (L) 12/15/2021    GLUCOSE 120 (H) 12/15/2021    ALT 8 12/15/2021    AST 13 12/15/2021    INR 1.0 10/05/2010     No results found for: CKTOTAL, CKMB, CKMBINDEX, TROPONINI        Recent Radiological Studies:  NM LUNG VENT/PERFUSION (VQ)   Preliminary Result   High probability for pulmonary embolus. The findings were sent to the Radiology Results [address] at 10:59   am on 12/15/2021to be communicated to a licensed caregiver. RECOMMENDATIONS:   Unavailable         XR CHEST PORTABLE   Final Result   No acute process. Infusion catheter in place. CT Head WO Contrast   Final Result   No acute intracranial abnormality. RECOMMENDATIONS:   Unavailable             Assessment  Active Hospital Problems    Diagnosis     Acute respiratory failure with hypoxia ([facility]) [J96.01]     Depression [F32. A]     Fibromyalgia [M79.7]     IBS (irritable bowel syndrome) [K58.9]     Sjogren's disease ([address] [M35.00]        Patient Active Problem List    Diagnosis Date Noted    Acute respiratory failure with hypoxia ([address] 12/15/2021    Sjogren's disease ([address] 12/15/2021    IBS (irritable bowel syndrome) 12/15/2021    Fibromyalgia 12/15/2021    Depression 12/15/2021       Plan  · Suspected pulmonary embolus   · Full dose lovenox   · ECHO to r/u R heart strain   · Allergic to IV contrast dye   · VQ scan noted   · Obtain pulmonary consultation while in patient   · Syncope and collapse   · Orthostatic hypotension - positive test   · Not on any home anti hypertensives  · Continue to monitor BP  · Ambulation with assistance only at this time   · PT/OT  · Home medications to be reconciled and confirmed prior to being ordered  · DVT prophylaxis   · Code Status Full  · Discharge plan: TBD pending clinical improvement     The patient was seen and evaluated by myself and Dr. Martha Genao MD PGY-1  12/15/2021  2:05 PM             Addendum: I have personally participated in a face-to-face history and physical exam on the date of service with the patient. I have discussed the case with the resident. I also participated in medical decision making with the resident on the date of service and I agree with all of the pertinent clinical information unless indicated in my editing of the note. I have reviewed and edited the note above based on my findings during my history, exam, and decision making on the same day of service. The vitals, labs, imaging, medications and treatment plan were reviewed independently by myself in addition to with the resident doctor. I agree with the above documentation and treatment plan     Electronically signed by Rohith Orozco MD on 12/15/2021 at 3:58 PM    I can be reached through [facility].

## 2021-12-15 NOTE — PROGRESS NOTES
Physical Therapy    Facility/Department: [facility] MED SURG  Initial Assessment    NAME: Randy Blue  : 1950  MRN: [id number]    Date of Service: 12/15/2021    Patient Diagnosis(es): The primary encounter diagnosis was Acute respiratory failure with hypoxia ([facility] [address] A diagnosis of Syncope and collapse was also pertinent to this visit. has a past medical history of Depression, Fibromyalgia, IBS (irritable bowel syndrome), Sjogren's disease ([facility] [address] and Thoracic compression fracture ([facility] [address] has a past surgical history that includes Hysterectomy. Referring provider:  Tolu Patel DO  PT Order:  PT eval and treat     Evaluating PT:  Lashanda Sneed PT, DPT PT [id number]    Room #:  6761/6022-G  Diagnosis:  Syncope and collapse [R55]  Acute respiratory failure with hypoxia (HCC) [J96.01]  Precautions:  Fall risk, O2  Equipment Needs:  TBD    SUBJECTIVE:    Pt lives with her  in a 1 story home with 2/3 stairs to enter and 1 rail. Bed and bath is on first floor. Pt ambulated with no device PTA. OBJECTIVE:   Initial Evaluation  Date: 12/15 Treatment Short Term/ Long Term   Goals   Was pt agreeable to Eval/treatment? yes     Does pt have pain?  None reported     Bed Mobility  Rolling: indepedent  Supine to sit: independent  Sit to supine: independent onto transport cart  Scooting: independent  Independent    Transfers Sit to stand: SBA  Stand to sit: SBA  Stand pivot: NT  independent   Ambulation    20 feet with no device Min A   100+ feet with AAD if needed supervision    Stair negotiation: ascended and descended  NT   3 steps with 1 rail SBA   ROM BLE:  WFL     Strength BLE:  Grossly 4/5  Grossly 4+/5   Balance Sitting EOB:  independent  Dynamic Standing:  Min A without device  Sitting EOB:  independent  Dynamic Standing:  Supervision with AAD if needed   AM-PAC 6 Clicks 39/65       Pt is A & O x 3  Sensation:  Pt denies numbness and tingling to extremities    Patient education  Pt educated on PT objectives treatments: 2-5x/week x 1-2 weeks. Time in  0923  Time out  0930    Evaluation Time includes thorough review of current medical information, gathering information on past medical history/social history and prior level of function, completion of standardized testing/informal observation of tasks, assessment of data and education on plan of care and goals.     CPT codes:  [x] Low Complexity PT evaluation 41476  [] Moderate Complexity PT evaluation 94817  [] High Complexity PT evaluation 17755  [] PT Re-evaluation 13765  [] Therapeutic activities 11762 __minutes  [] Therapeutic exercises 92874 __ minutes      Latisha Zhu, PT, DPT  PT [id number]

## 2021-12-15 NOTE — CARE COORDINATION
Care coordination: Met with patient bedside re: discharge planning/ care transitions. Patient admitted for syncopal episode; VQ scan completed this am.   Patient lives at home with her  in a 1 story home and uses a cane prn. [facility] 20/24. PCP is Dr. Albina Hewitt and oncologist is Dr. Alan Queen. No [facility] or [facility]. Patient is a retired nurse. Plan at discharge is HOME with . Will follow along with  and assist with discharge planning as necessary pending anticoagulant recommendations.      Fan Hunter RN  Case Management

## 2021-12-15 NOTE — ED TRIAGE NOTES
Was at to dinner with family at the [employer] at [facility], had syncope eposide, hit head on table. Had one glas of rum and coke, not a drinker, has chemo weekly, on Thursday @ [facility] for breast cancer. Last 3 months. Has port left upper chest not accessed.  at bedside. No pain noted.

## 2021-12-15 NOTE — PROGRESS NOTES
Pt on 2L oxygen, saturation 97%. Placed on room air and dropped to 85%. Pt returned to 2L with a saturation of 95%.

## 2021-12-15 NOTE — ED NOTES
SBAR faxed to [facility] ; receipt confirmed with staff and fax confirmation.      Danial Cranker, RN  12/15/21 7732

## 2021-12-15 NOTE — ED PROVIDER NOTES
Patient history of breast cancer on chemotherapy with last treatment being last Thursday presents with syncope. She states that earlier today she has been feeling short of breath and malaise. While she was at dinner she had an episode of syncope where she fell forward onto the table from a seated position. She did not fall to the ground. She states that she did not have any precipitating symptoms. She reports that the people around her state that she was down for a few minutes. She did not have a postictal episode and there was no shaking noted. She has never had these episodes before. She states that she did have some nausea afterwards. Patient denies any headaches, vision changes, chest pain, vomiting, abdominal pain, lower extremity swelling, or calf pain. The history is provided by the patient and the spouse. No  was used. Review of Systems   Constitutional: Negative for chills and fever. HENT: Negative for ear pain, sinus pressure and sore throat. Eyes: Negative for pain, discharge and redness. Respiratory: Positive for shortness of breath. Negative for cough and wheezing. Cardiovascular: Negative for chest pain. Gastrointestinal: Positive for nausea. Negative for abdominal distention, diarrhea and vomiting. Genitourinary: Negative for dysuria and frequency. Musculoskeletal: Negative for arthralgias and back pain. Skin: Negative for rash and wound. Neurological: Positive for syncope. Negative for weakness and headaches. Hematological: Negative for adenopathy. All other systems reviewed and are negative. Physical Exam  Vitals and nursing note reviewed. Constitutional:       Appearance: She is well-developed. HENT:      Head: Normocephalic and atraumatic. Eyes:      Conjunctiva/sclera: Conjunctivae normal.   Cardiovascular:      Rate and Rhythm: Normal rate and regular rhythm. Heart sounds: Normal heart sounds.  No murmur heard.      Pulmonary:      Effort: Pulmonary effort is normal. No respiratory distress. Breath sounds: Normal breath sounds. No wheezing or rales. Abdominal:      General: Bowel sounds are normal.      Palpations: Abdomen is soft. Tenderness: There is no abdominal tenderness. There is no guarding or rebound. Musculoskeletal:      Cervical back: Normal range of motion and neck supple. Skin:     General: Skin is warm and dry. Neurological:      Mental Status: She is alert and oriented to person, place, and time. Cranial Nerves: No cranial nerve deficit. Coordination: Coordination normal.          Procedures     MDM  Number of Diagnoses or Management Options  Diagnosis management comments: Patient presents after syncopal episode. She is nauseous as well. Zofran given for nausea. Fluids were started due to her tachycardia. Patient is also hypoxic on room air. Patient was placed on 3 L nasal cannula with improvement to the mid 90s. CBC was obtained which did show a leukolymphocytopenia but was otherwise unremarkable without anemia or leukocytosis. CMP was unremarkable. D-dimer was elevated at 4100. Patient does have an anaphylactic allergy to iodine. VQ scan was ordered and is pending. Spoke with the hospitalist regarding need for anticoagulation until PE is ruled out. Initial troponin was 24. Repeat was 19. Covid was negative. Chest x-ray did not show any acute cardiopulmonary processes. CT of the head was without any acute intracranial abnormalities. At this time however patient continues to require oxygen to meet her demands. Patient was informed the results and plan and is agreeable. Patient will be admitted to telemetry for further evaluation and care.        Amount and/or Complexity of Data Reviewed  Clinical lab tests: reviewed  Tests in the radiology section of CPT®: reviewed  Tests in the medicine section of CPT®: reviewed         ED Course as of 12/15/21 0127   Wed Dec 15, 2021   0121 EKG shows normal sinus rhythm with a ventricular rate of 99 bpm.  Normal axis. There are no obvious ST elevations or T wave inversions present. QT is not prolonged. No previous EKG for comparison. [BB]      ED Course User Index  [BB] Jen Graves DO        ED Course as of 12/15/21 0127   Wed Dec 15, 2021   0121 EKG shows normal sinus rhythm with a ventricular rate of 99 bpm.  Normal axis. There are no obvious ST elevations or T wave inversions present. QT is not prolonged. No previous EKG for comparison. [BB]      ED Course User Index  [BB] Jen Graves DO       --------------------------------------------- PAST HISTORY ---------------------------------------------  Past Medical History:  has a past medical history of Depression, Fibromyalgia, IBS (irritable bowel syndrome), Sjogren's disease ([facility] [address] and Thoracic compression fracture ([facility] [address] Past Surgical History:  has a past surgical history that includes Hysterectomy. Social History:  reports that she has never smoked. She has never used smokeless tobacco. She reports current alcohol use. She reports that she does not use drugs. Family History: family history is not on file. The patients home medications have been reviewed. Allergies:  Iodides    -------------------------------------------------- RESULTS -------------------------------------------------    LABS:  Results for orders placed or performed during the hospital encounter of 12/14/21   COVID-19, Rapid    Specimen: Nasopharyngeal Swab   Result Value Ref Range    SARS-CoV-2, NAAT Not Detected Not Detected   CBC Auto Differential   Result Value Ref Range    WBC 3.8 (L) 4.5 - 11.5 E9/L    RBC 4.01 3.50 - 5.50 E12/L    Hemoglobin 11.6 11.5 - 15.5 g/dL    Hematocrit 37.1 34.0 - 48.0 %    MCV 92.5 80.0 - 99.9 fL    MCH 28.9 26.0 - 35.0 pg    MCHC 31.3 (L) 32.0 - 34.5 %    RDW 15.4 (H) 11.5 - 15.0 fL    Platelets 504 522 - 349 E9/L    MPV 11.0 7.0 - 12.0 fL Neutrophils % 86.5 (H) 43.0 - 80.0 %    Immature Granulocytes % 1.1 0.0 - 5.0 %    Lymphocytes % 7.9 (L) 20.0 - 42.0 %    Monocytes % 3.7 2.0 - 12.0 %    Eosinophils % 0.5 0.0 - 6.0 %    Basophils % 0.3 0.0 - 2.0 %    Neutrophils Absolute 3.29 1.80 - 7.30 E9/L    Immature Granulocytes # 0.04 E9/L    Lymphocytes Absolute 0.30 (L) 1.50 - 4.00 E9/L    Monocytes Absolute 0.14 0.10 - 0.95 E9/L    Eosinophils Absolute 0.02 (L) 0.05 - 0.50 E9/L    Basophils Absolute 0.01 0.00 - 0.20 E9/L    Anisocytosis 1+     Poikilocytes 1+     Ovalocytes 1+     Tear Drop Cells 1+    Comprehensive Metabolic Panel w/ Reflex to MG   Result Value Ref Range    Sodium 136 132 - 146 mmol/L    Potassium reflex Magnesium 4.1 3.5 - 5.0 mmol/L    Chloride 98 98 - 107 mmol/L    CO2 26 22 - 29 mmol/L    Anion Gap 12 7 - 16 mmol/L    Glucose 124 (H) 74 - 99 mg/dL    BUN 9 6 - 23 mg/dL    CREATININE 0.7 0.5 - 1.0 mg/dL    GFR Non-African American >60 >=60 mL/min/1.73    GFR African American >60     Calcium 9.0 8.6 - 10.2 mg/dL    Total Protein 6.7 6.4 - 8.3 g/dL    Albumin 3.8 3.5 - 5.2 g/dL    Total Bilirubin 0.5 0.0 - 1.2 mg/dL    Alkaline Phosphatase 91 35 - 104 U/L    ALT 12 0 - 32 U/L    AST 16 0 - 31 U/L   Troponin   Result Value Ref Range    Troponin, High Sensitivity 24 (H) 0 - 9 ng/L   D-Dimer, Quantitative   Result Value Ref Range    D-Dimer, Quant 4100 ng/mL DDU   SPECIMEN REJECTION   Result Value Ref Range    Rejected Test trp5     Reason for Rejection see below    Troponin   Result Value Ref Range    Troponin, High Sensitivity 19 (H) 0 - 9 ng/L   Brain Natriuretic Peptide   Result Value Ref Range    Pro-BNP 1,966 (H) 0 - 125 pg/mL   Brain Natriuretic Peptide   Result Value Ref Range    Pro-BNP 1,680 (H) 0 - 125 pg/mL   EKG 12 Lead   Result Value Ref Range    Ventricular Rate 99 BPM    Atrial Rate 99 BPM    P-R Interval 152 ms    QRS Duration 70 ms    Q-T Interval 366 ms    QTc Calculation (Bazett) 469 ms    P Axis 54 degrees    R Axis 27 during their ED course. Diagnosis:  1. Acute respiratory failure with hypoxia ([address]    2. Syncope and collapse        Disposition:  Patient's disposition: Admit to telemetry  Patient's condition is stable.     Patient was seen and evaluated by both myself and Nathaly Bianchi [address] [place] [address]  Resident  12/15/21 2967

## 2021-12-15 NOTE — PROGRESS NOTES
Dr. Ellender Siemens called and notified of NM Lung Vent/Perfusion Scan result with new orders received.

## 2021-12-16 VITALS
DIASTOLIC BLOOD PRESSURE: 63 MMHG | HEART RATE: 98 BPM | OXYGEN SATURATION: 95 % | RESPIRATION RATE: 18 BRPM | WEIGHT: 187.1 LBS | HEIGHT: 64 IN | TEMPERATURE: 97.3 F | SYSTOLIC BLOOD PRESSURE: 106 MMHG | BODY MASS INDEX: 31.94 KG/M2

## 2021-12-16 LAB
ALBUMIN SERPL-MCNC: 3.6 G/DL (ref 3.5–5.2)
ALP BLD-CCNC: 90 U/L (ref 35–104)
ALT SERPL-CCNC: 12 U/L (ref 0–32)
ANION GAP SERPL CALCULATED.3IONS-SCNC: 8 MMOL/L (ref 7–16)
ANISOCYTOSIS: ABNORMAL
AST SERPL-CCNC: 17 U/L (ref 0–31)
BASOPHILS ABSOLUTE: 0.04 E9/L (ref 0–0.2)
BASOPHILS RELATIVE PERCENT: 1.6 % (ref 0–2)
BILIRUB SERPL-MCNC: 0.4 MG/DL (ref 0–1.2)
BUN BLDV-MCNC: 8 MG/DL (ref 6–23)
CALCIUM SERPL-MCNC: 8.5 MG/DL (ref 8.6–10.2)
CHLORIDE BLD-SCNC: 105 MMOL/L (ref 98–107)
CO2: 26 MMOL/L (ref 22–29)
CREAT SERPL-MCNC: 0.7 MG/DL (ref 0.5–1)
EOSINOPHILS ABSOLUTE: 0.09 E9/L (ref 0.05–0.5)
EOSINOPHILS RELATIVE PERCENT: 3.6 % (ref 0–6)
GFR AFRICAN AMERICAN: >60
GFR NON-AFRICAN AMERICAN: >60 ML/MIN/1.73
GLUCOSE BLD-MCNC: 115 MG/DL (ref 74–99)
HCT VFR BLD CALC: 35.6 % (ref 34–48)
HEMOGLOBIN: 10.3 G/DL (ref 11.5–15.5)
IMMATURE GRANULOCYTES #: 0.02 E9/L
IMMATURE GRANULOCYTES %: 0.8 % (ref 0–5)
LYMPHOCYTES ABSOLUTE: 0.84 E9/L (ref 1.5–4)
LYMPHOCYTES RELATIVE PERCENT: 33.7 % (ref 20–42)
MCH RBC QN AUTO: 29.8 PG (ref 26–35)
MCHC RBC AUTO-ENTMCNC: 28.9 % (ref 32–34.5)
MCV RBC AUTO: 102.9 FL (ref 80–99.9)
MONOCYTES ABSOLUTE: 0.2 E9/L (ref 0.1–0.95)
MONOCYTES RELATIVE PERCENT: 8 % (ref 2–12)
NEUTROPHILS ABSOLUTE: 1.3 E9/L (ref 1.8–7.3)
NEUTROPHILS RELATIVE PERCENT: 52.3 % (ref 43–80)
OVALOCYTES: ABNORMAL
PDW BLD-RTO: 15.7 FL (ref 11.5–15)
PLATELET # BLD: 130 E9/L (ref 130–450)
PMV BLD AUTO: 10.9 FL (ref 7–12)
POIKILOCYTES: ABNORMAL
POTASSIUM REFLEX MAGNESIUM: 3.7 MMOL/L (ref 3.5–5)
RBC # BLD: 3.46 E12/L (ref 3.5–5.5)
REASON FOR REJECTION: NORMAL
REJECTED TEST: NORMAL
SODIUM BLD-SCNC: 139 MMOL/L (ref 132–146)
TOTAL PROTEIN: 6.4 G/DL (ref 6.4–8.3)
WBC # BLD: 2.5 E9/L (ref 4.5–11.5)

## 2021-12-16 PROCEDURE — 80053 COMPREHEN METABOLIC PANEL: CPT

## 2021-12-16 PROCEDURE — 6360000002 HC RX W HCPCS: Performed by: INTERNAL MEDICINE

## 2021-12-16 PROCEDURE — 2700000000 HC OXYGEN THERAPY PER DAY

## 2021-12-16 PROCEDURE — 6370000000 HC RX 637 (ALT 250 FOR IP): Performed by: STUDENT IN AN ORGANIZED HEALTH CARE EDUCATION/TRAINING PROGRAM

## 2021-12-16 PROCEDURE — 36415 COLL VENOUS BLD VENIPUNCTURE: CPT

## 2021-12-16 PROCEDURE — 85025 COMPLETE CBC W/AUTO DIFF WBC: CPT

## 2021-12-16 PROCEDURE — 6370000000 HC RX 637 (ALT 250 FOR IP): Performed by: INTERNAL MEDICINE

## 2021-12-16 PROCEDURE — 97165 OT EVAL LOW COMPLEX 30 MIN: CPT

## 2021-12-16 PROCEDURE — 2580000003 HC RX 258: Performed by: INTERNAL MEDICINE

## 2021-12-16 RX ADMIN — DULOXETINE HYDROCHLORIDE 30 MG: 60 CAPSULE, DELAYED RELEASE ORAL at 08:36

## 2021-12-16 RX ADMIN — SERTRALINE 100 MG: 100 TABLET, FILM COATED ORAL at 08:36

## 2021-12-16 RX ADMIN — PANTOPRAZOLE SODIUM 40 MG: 40 TABLET, DELAYED RELEASE ORAL at 06:14

## 2021-12-16 RX ADMIN — ACETAMINOPHEN 650 MG: 325 TABLET ORAL at 14:01

## 2021-12-16 RX ADMIN — TRAZODONE HYDROCHLORIDE 50 MG: 50 TABLET ORAL at 00:02

## 2021-12-16 RX ADMIN — SODIUM CHLORIDE, PRESERVATIVE FREE 10 ML: 5 INJECTION INTRAVENOUS at 08:36

## 2021-12-16 RX ADMIN — ENOXAPARIN SODIUM 80 MG: 100 INJECTION SUBCUTANEOUS at 08:36

## 2021-12-16 ASSESSMENT — PAIN SCALES - GENERAL
PAINLEVEL_OUTOF10: 0
PAINLEVEL_OUTOF10: 0
PAINLEVEL_OUTOF10: 3

## 2021-12-16 NOTE — PROGRESS NOTES
Occupational Therapy  OCCUPATIONAL THERAPY INITIAL EVALUATION     Ирина Benitez [place], [place]        IOAC:                                                  Patient Name: Kuldeep Aranda    MRN: [id number]    : 1950    Room: [address] [place], [place] [address]      Evaluating OT: Koby Anthony OTR/L [id number]      Referring Provider: Greg Waite DO    Specific Provider Orders/Date: OT eval and treat 12/15/21      Diagnosis: Syncope and collapse [R55]  Acute respiratory failure with hypoxia ([address] [J96.01]      Pertinent Medical History: depression, fibromyalgia, IBS, thoracic compression fracture T12      Precautions:  Fall Risk, O2 2L NC     Assessment of current deficits    [x] Functional mobility  [x]ADLs  [x] Strength               []Cognition    [x] Functional transfers   [x] IADLs         [x] Safety Awareness   [x]Endurance    [] Fine Coordination              [x] Balance      [] Vision/perception   []Sensation     []Gross Motor Coordination  [] ROM  [] Delirium                   [] Motor Control     OT PLAN OF CARE   OT POC based on physician orders, patient diagnosis and results of clinical assessment    Frequency/Duration 2-4 days/wk for 2 weeks PRN   Specific OT Treatment Interventions to include:   * Instruction/training on adapted ADL techniques and AE recommendations to increase functional independence within precautions       * Training on energy conservation strategies, correct breathing pattern and techniques to improve independence/tolerance for self-care routine  * Functional transfer/mobility training/DME recommendations for increased independence, safety, and fall prevention  * Patient/Family education to increase follow through with safety techniques and functional independence  * Recommendation of environmental modifications for increased safety with functional transfers/mobility and ADLs  * Therapeutic exercise to improve motor endurance, ROM, and functional strength for ADLs/functional transfers  * Therapeutic activities to facilitate/challenge dynamic balance, stand tolerance for increased safety and independence with ADLs        Recommended Adaptive Equipment: TBD     Home Living: Pt lives with  in 1 story house with 2-3 SAQIB and 1 hand rail. Bathroom setup: tub/shower with grab bars, standard commode   Equipment owned: quad cane    Prior Level of Function: independent with ADLs , assist from  with IADLs; functional mobility: no device    Pain Level: Pt reported some discomfort in chest but was agreeable to therapy  Cognition: A&O: 4/4; WFL command follow demonstrated. Pt was pleasant and agreeable to therapy   Memory:  good   Sequencing:  good   Problem solving:  good   Judgement/safety:  good     Functional Assessment:  AM-PAC Daily Activity Raw Score: 18/24   Initial Eval Status  Date: 12/16/21 Treatment Status  Date: STGs = LTGs  Time frame: 10-14 days   Feeding Set up     Grooming Min A  Mod I   UB Dressing Min A  Mod I   LB Dressing SBA to don/doff socks seated EOB  Min A for simulated pants   Mod I-with use of AD as appropriate/needed   Bathing Min A   Mod I -with use of AD as appropriate/needed   Toileting Min A  Mod I    Bed Mobility  Supine to sit: I   Sit to supine: I      Functional Transfers SBA with no device  Sit to stand from EOB  Sit<>stand from commode  Stand to sit to chair  I    Functional Mobility Min A with no device  To and from bathroom  Handheld assistance as pt reported some fatigue with mobility  I -with device as needed to maximize independence with ADLs and functional task completion   Balance Sitting:     Static:  I    Dynamic:SBA  Standing: Min A with wheeled walker  I for dynamic sitting balance and standing balance to maximize independence with ADLs and functional task completion   Activity Tolerance Fair- with light activity. Pt limited by fatigue. Fair+ with ADL activity.  Pt will demonstrate good understanding of education provided on EC/WS techniques    Visual/  Perceptual Glasses: none at bedside                Additional long-term goal: Pt will increase functional independence to PLOF to allow pt to live in least restrictive environment. Hand Dominance R   AROM (PROM) Strength Additional Info:    RUE  WFL 4-/5 good  and wfl FMC/dexterity noted during ADL tasks       LUE WFL 4-/5 good  and wfl FMC/dexterity noted during ADL tasks       Hearing: WFL   Sensation:  No c/o numbness or tingling   Tone: WFL   Edema: some swelling in BLE    Comments: Upon arrival patient lying in bed. At end of session, patient sitting in chair with call light and phone within reach, all lines and tubes intact. Overall patient demonstrated decreased independence and safety during completion of ADL/functional transfer/mobility tasks. Pt would benefit from continued skilled OT to increase safety and independence with completion of ADL/IADL tasks for functional independence and quality of life. Rehab Potential: Good for established goals     Patient / Family Goal: none stated    Patient and/or family were instructed on functional diagnosis, prognosis/goals and OT plan of care. Demonstrated good understanding. Eval Complexity: Low    Time In: 0842  Time Out: 6157    Min Units   OT Eval Low 97165  x  1   OT Eval Medium 10881      OT Eval High 82846      OT Re-Eval [id number]       Therapeutic Ex [id number]       Therapeutic Activities 49057       ADL/Self Care 61554       Orthotic Management 99045       Manual 07770     Neuro Re-Ed 21436       Non-Billable Time          Evaluation Time additionally includes thorough review of current medical information, gathering information on past medical history/social history and prior level of function, interpretation of standardized testing/informal observation of tasks, assessment of data and development of plan of care and goals.             Leslie Saenz, OTR/L, [id number]

## 2021-12-16 NOTE — DISCHARGE SUMMARY
Internal Medicine Discharge Summary    NAME: González Rodriguez :  1950  MRN:  [id number] Burak Joseph MD    ADMITTED: 2021   DISCHARGED: 2021  4:34 PM    ADMITTING PHYSICIAN: Joselin att. providers found    PCP: Yuridia Flores MD    CONSULTANT(S):   IP CONSULT TO SOCIAL WORK  IP CONSULT TO PULMONOLOGY     ADMITTING DIAGNOSIS:   Syncope and collapse [R55]  Acute respiratory failure with hypoxia ([address] [J96.01]     Please see H&P for further details    DISCHARGE DIAGNOSES:   Active Hospital Problems    Diagnosis     Acute respiratory failure with hypoxia ([address] [J96.01]     Depression [F32. A]     Fibromyalgia [M79.7]     IBS (irritable bowel syndrome) [K58.9]     Sjogren's disease (HCC) [M35.00]        BRIEF HISTORY OF PRESENT ILLNESS: González Rodriguez is a 70 y.o. female patient of Yuriida Flores MD who  has a past medical history of Depression, Fibromyalgia, IBS (irritable bowel syndrome), Sjogren's disease ([address] and Thoracic compression fracture ([address] who originally had concerns including Loss of Consciousness (was at dinner at the [employer] with family, family states syncope eposide, hit head on table. no blood thinners). at presentation on 2021, and was found to have Syncope and collapse [R55]  Acute respiratory failure with hypoxia ([address] [J96.01] after workup. Please see H&P for further details. HOSPITAL COURSE:   The patient presented to the hospital with the chief complaint of Loss of Consciousness (was at dinner at the [employer] with family, family states syncope eposide, hit head on table. no blood thinners)  . The patient was admitted to the hospital.     Echocardiogram   12/15/21  Summary   Normal left ventricular chamber size. Normal left ventricular systolic function. Visually estimated LVEF is greater than 60 %. No wall motion abnormalities. Mildly dilated right ventricle. Normal right ventricle systolic function.    Normal tricuspid valve structure and function. Physiologic and/or trace tricuspid regurgitation. Pulmonic valve is structurally normal.   Trace pulmonic regurgitation present. Plan  Suspected pulmonary embolus   ? Full dose lovenox - switch to Eliquis today per pulm   ? ECHO noted, no R heart strain present   ? Allergic to IV contrast dye   ? VQ scan noted   ? Pulm has evaluated the patient   Syncope and collapse   ? Orthostatic hypotension - positive test on admit, now negative   ? Not on any home anti hypertensives  Continue to monitor BP    OK for DC home with 2 L O2 and close op fu with PCP and pulm      BRIEF PHYSICAL EXAMINATION AND LABORATORIES ON DAY OF DISCHARGE:  VITALS:  /63   Pulse 98   Temp 97.3 °F (36.3 °C) (Oral)   Resp 18   Ht 5' 4\" (1.626 m)   Wt 187 lb 1.6 oz (84.9 kg)   SpO2 95%   BMI 32.12 kg/m²       Please see note from the same day. LABS[de-identified]  Recent Labs     12/14/21  2157 12/15/21  0345 12/16/21  0745    141 139   K 4.1 3.5 3.7   CL 98 109* 105   CO2 26 19* 26   BUN 9 8 8   CREATININE 0.7 0.5 0.7   GLUCOSE 124* 120* 115*   CALCIUM 9.0 7.1* 8.5*     Recent Labs     12/14/21  2157 12/15/21  0345 12/16/21  0745   ALKPHOS 91 75 90   ALT 12 8 12   AST 16 13 17   PROT 6.7 5.3* 6.4   BILITOT 0.5 0.3 0.4   LABALBU 3.8 2.8* 3.6     Recent Labs     12/14/21  2157 12/15/21  0345 12/16/21  0500   WBC 3.8* 2.9* 2.5*   RBC 4.01 3.54 3.46*   HGB 11.6 10.5* 10.3*   HCT 37.1 33.4* 35.6   MCV 92.5 94.4 102.9*   MCH 28.9 29.7 29.8   MCHC 31.3* 31.4* 28.9*   RDW 15.4* 15.5* 15.7*    144 130   MPV 11.0 10.8 10.9     No results found for: LABA1C  Lab Results   Component Value Date    INR 1.0 10/05/2010    PROTIME 11.1 10/05/2010      No results found for: TSH  No results found for: TRIG, HDL, LDLCALC  Recent Labs     12/15/21  0345   MG 1.9       No results for input(s): CKTOTAL, CKMB, TROPONINI in the last 72 hours. No results for input(s): LACTA in the last 72 hours.     IMAGING:  Echo Complete    Result Date: 12/16/2021  Transthoracic Echocardiography Report (TTE)  Demographics   Patient Name       Armando Harrell  Gender              Female                     K   Medical Record     [id number]       Room Number         2209  Number   Account #          [de-identified]      Procedure Date      12/15/2021   Corporate ID                      Ordering Physician   Accession Number   [id number]     Referring Physician Gil Dey MD   Date of Birth      1950     Sonographer         Barney Elsie                                                        [facility]   Age                70 year(s)     Interpreting        Marcela Lewis MD                                    Physician           Lynda Sicard MD                                     Any Other  Procedure Type of Study   TTE procedure:Echo Complete W/Doppler & Color Flow. Procedure Date Date: 12/15/2021 Start: 02:05 PM Study Location: Portable Technical Quality: Limited visualization due to poor acoustical window. Indications:Pulmonary embolus and Syncope. Patient Status: Routine Contrast Medium: Definity. Height: 64 inches Weight: 185 pounds BSA: 1.89 m^2 BMI: 31.75 kg/m^2 HR: 101 bpm BP: 106/56 mmHg  Findings   Left Ventricle  Normal left ventricular chamber size. Normal left ventricular systolic function. Visually estimated LVEF is greater than 60 %. No wall motion abnormalities. Right Ventricle  Mildly dilated right ventricle. Normal right ventricle systolic function. Left Atrium  Left atrium is of normal size. Right Atrium  Normal right atrium. Mitral Valve  Physiologic and/or trace mitral regurgitation is present. No evidence of hemodynamically significant mitral stenosis. Tricuspid Valve  Normal tricuspid valve structure and function. Physiologic and/or trace tricuspid regurgitation. Aortic Valve  Trileaflet aortic valve. Normal leaflet mobility. No aortic stenosis.   No aortic regurgitation. Pulmonic Valve  Pulmonic valve is structurally normal.  Trace pulmonic regurgitation present. Pericardial Effusion  No evidence for hemodynamically significant pericardial effusion. Aorta  Normal aortic root and ascending aorta. Conclusions   Summary  Normal left ventricular chamber size. Normal left ventricular systolic function. Visually estimated LVEF is greater than 60 %. No wall motion abnormalities. Mildly dilated right ventricle. Normal right ventricle systolic function. Normal tricuspid valve structure and function. Physiologic and/or trace tricuspid regurgitation. Pulmonic valve is structurally normal.  Trace pulmonic regurgitation present. Signature   ----------------------------------------------------------------  Electronically signed by Smita Ghosh MD(Interpreting  physician) on 12/16/2021 01:52 PM  ----------------------------------------------------------------  M-Mode/2D Measurements & Calculations   LV Diastolic    LV Systolic Dimension: 2.6  AV Cusp Separation: 1.6 cmLA  Dimension: 3.8  cm                          Dimension: 3.9 cmAO Root  cm              LV Volume Diastolic: 07.7   Dimension: 2.6 cm  LV FS:31.6 %    ml  LV PW           LV Volume Systolic: 24 ml  Diastolic: 0.9  LV EDV/LV EDV Index: 63.2  cm              ml/33 ml/m^2LV ESV/LV ESV   RV Diastolic Dimension: 3.5 cm  LV PW Systolic: Index: 24 NK/01SW/ m^2  1 cm            EF Calculated: 62 %         LA/Aorta: 1.5  Septum          LV Mass Index: 58 l/min*m^2 Ascending Aorta: 3.2 cm  Diastolic: 1 cm LV Length: 7.1 cm           LA volume/Index: 30 ml  Septum                                      /15. 41QZ/M^4  Systolic: 1.1   LVOT: 2 cm                  RA Area: 13.2 cm^2  cm  CO: 6.28 l/min                              IVC Expiration: 1.6 cm  CI: 3.32  l/m*m^2  LV Mass: 109.03  g  Doppler Measurements & Calculations   MV Peak E-Wave: 0.89 AV Peak Velocity: 1.79 LVOT Peak Velocity: 1.26 m/s  m/s m/s                    LVOT Mean Velocity: 0.75 m/s  MV Peak A-Wave: 1.18 AV Peak Gradient:      LVOT Peak Gradient: 6.4  m/s                  12.74 mmHg             mmHgLVOT Mean Gradient: 2.8  MV E/A Ratio: 0.75   AV Mean Velocity: 1.19 mmHg  MV Peak Gradient:    m/s                    Estimated RAP:3 mmHg  6.6 mmHg             AV Mean Gradient: 6.4  MV Mean Gradient:    mmHg  3.2 mmHg             AV VTI: 29 cm  MV Mean Velocity:    AV Area  0.85 m/s             (Continuity):2.14 cm^2 PV Peak Velocity: 0.62 m/s  MV Deceleration                             PV Peak Gradient: 1.55 mmHg  Time: 232.4 msec     LVOT VTI: 19.8 cm      PV Mean Velocity: 0.48 m/s  MV P1/2t: 49.6 msec  IVRT: 110.7 msec       PV Mean Gradient: 1 mmHg  MVA by PHT:4.44 cm^2  MV Area  (continuity): 2.3  cm^2  MV E' Septal  Velocity: 0.06 m/s  MV E' Lateral  Velocity: 10 m/s  [email or web address] [id number]    CT Head WO Contrast    Result Date: 12/14/2021  EXAMINATION: CT OF THE HEAD WITHOUT CONTRAST  12/14/2021 9:31 pm TECHNIQUE: CT of the head was performed without the administration of intravenous contrast. Dose modulation, iterative reconstruction, and/or weight based adjustment of the mA/kV was utilized to reduce the radiation dose to as low as reasonably achievable. COMPARISON: December 3, 2019 HISTORY: ORDERING SYSTEM PROVIDED HISTORY: Syncope TECHNOLOGIST PROVIDED HISTORY: Reason for exam:->Syncope Has a \"code stroke\" or \"stroke alert\" been called? ->No Decision Support Exception - unselect if not a suspected or confirmed emergency medical condition->Emergency Medical Condition (MA) FINDINGS: BRAIN/VENTRICLES: There are age related cortical atrophy and periventricular white matter ischemic changes. There is no acute intracranial hemorrhage, mass effect or midline shift. No abnormal extra-axial fluid collection.   The gray-white differentiation is maintained without evidence of an acute infarct. There is no evidence of hydrocephalus. ORBITS: The visualized portion of the orbits demonstrate no acute abnormality. SINUSES: The visualized paranasal sinuses and mastoid air cells demonstrate no acute abnormality. SOFT TISSUES/SKULL:  No acute abnormality of the visualized skull or soft tissues. No acute intracranial abnormality. RECOMMENDATIONS: Unavailable     NM LUNG VENT/PERFUSION (VQ)    Result Date: 12/15/2021  EXAMINATION: NUCLEAR MEDICINE VENTILATION PERFUSION SCAN. 12/15/2021 TECHNIQUE: 68.6 millicuries aerosolized Xe 133 was administered via mask prior to planar imaging of the lungs in multiple projections. Then, 7.9 millicuries of Tc 18W MAA was administered intravenously prior to planar imaging of the lungs in similar projections. COMPARISON: Chest radiograph 14 December 2021. HISTORY: ORDERING SYSTEM PROVIDED HISTORY: Hypoxia, Elevated Ddimer TECHNOLOGIST PROVIDED HISTORY: Reason for exam:->Hypoxia, Elevated Ddimer Decision Support Exception - unselect if not a suspected or confirmed emergency medical condition->Emergency Medical Condition (MA) What reading provider will be dictating this exam?->CRC FINDINGS: PERFUSION: Heterogeneous distribution of radiotracer. Segmental defects are noted in the right apex, superior segment of the right lower lobe and anterior right middle lobe as well as metal defect in the left apex with subsegmental defect in the left lingula. VENTILATION: Heterogeneous distribution of radiotracer with ventilation defects in the anterior portion of the left lung. Some residual is present on washout at the right lung base. CHEST RADIOGRAPH: No focal areas of consolidation or significant effusions on recent chest radiograph. High probability for pulmonary embolus. The findings were sent to the Radiology Results [address] at 10:59 am on 12/15/2021to be communicated to a licensed caregiver. RECOMMENDATIONS: Unavailable     XR CHEST PORTABLE    Result Date: 12/14/2021  EXAMINATION: ONE XRAY VIEW OF THE CHEST 12/14/2021 9:47 pm COMPARISON: None. HISTORY: ORDERING SYSTEM PROVIDED HISTORY: Syncope TECHNOLOGIST PROVIDED HISTORY: Reason for exam:->Syncope FINDINGS: The lungs are without acute focal process. There is no effusion or pneumothorax. The cardiomediastinal silhouette is without acute process. The osseous structures are without acute process. No acute process. Infusion catheter in place. MICROBIOLOGY:  BLOOD CX #1  No results for input(s): BC in the last 72 hours. BLOOD CX #2  No results for input(s): Parul Tucker in the last 72 hours. TIP CULTURE  No results for input(s): CXCATHTIP in the last 72 hours. CULTURE, RESPIRATORY   No results for input(s): CULTRESP in the last 72 hours. RESPIRATORY SMEAR  No results for input(s): RESPSMEAR in the last 72 hours. ECHO:  Above     DISPOSITION:  The patient's condition is good. The patient is being discharged to home    DISCHARGE MEDICATIONS:      Medication List      START taking these medications    * apixaban 5 MG Tabs tablet  Commonly known as: ELIQUIS  Take 2 tablets by mouth 2 times daily for 14 doses     * apixaban 5 MG Tabs tablet  Commonly known as: ELIQUIS  Take 1 tablet by mouth 2 times daily  Start taking on: December 23, 2021         * This list has 2 medication(s) that are the same as other medications prescribed for you. Read the directions carefully, and ask your doctor or other care provider to review them with you.             CONTINUE taking these medications    Advil 200 MG tablet  Generic drug: ibuprofen     cyclobenzaprine 10 MG tablet  Commonly known as: FLEXERIL     DULoxetine 30 MG extended release capsule  Commonly known as: CYMBALTA     meloxicam 15 MG tablet  Commonly known as: MOBIC  Take 1 tablet by mouth daily as needed for Pain     ondansetron 4 MG tablet  Commonly known as: ZOFRAN     Prevacid 15 MG delayed release capsule  Generic drug: lansoprazole     raNITIdine 300 MG tablet  Commonly known as: ZANTAC     sertraline 100 MG tablet  Commonly known as: ZOLOFT     traZODone 50 MG tablet  Commonly known as: DESYREL     TYLENOL 500 MG tablet  Generic drug: acetaminophen           Where to Get Your Medications      These medications were sent to [facility], [address], [address] [place] [address]    Phone: [phone]   · apixaban 5 MG Tabs tablet  · apixaban 5 MG Tabs tablet         Discharge Medication List as of 12/16/2021  4:04 PM        Discharge Medication List as of 12/16/2021  4:04 PM        Discharge Medication List as of 12/16/2021  4:04 PM      START taking these medications    Details   !! apixaban (ELIQUIS) 5 MG TABS tablet Take 2 tablets by mouth 2 times daily for 14 doses, Disp-60 tablet, R-0Normal      !! apixaban (ELIQUIS) 5 MG TABS tablet Take 1 tablet by mouth 2 times daily, Disp-60 tablet, R-0Normal       !! - Potential duplicate medications found. Please discuss with provider. INTERNAL MEDICINE FOLLOW UP/INSTRUCTIONS:  · Follow-up with primary care physician within 1 week of discharge from hospital  · Please review changes to pre-hospital admission medications and prescriptions for new medications upon discharge from the hospital with PCP  · Please review results of labs and imaging studies with PCP  · Follow-up with consultants as directed by them   · If recurrence or worsening of symptoms please call primary care physician or return to the ER immediately  · Diet: ADULT DIET; Regular    Preparing for this patient's discharge, including paperwork, orders, instructions, and meeting with patient did required >35 minutes.     Electronically signed by Isabella Whitehead MD on 12/16/2021 at 5:56 PM

## 2021-12-16 NOTE — PROGRESS NOTES
Pulse OX was 83% on room air at rest.  Ambulated patient on room air. Oxygen saturation was 81% on room air while ambulating  Oxygen applied.    Recovery pulse ox 92% on 2L oxygen while ambulating

## 2021-12-16 NOTE — PROGRESS NOTES
Internal Medicine Progress Note    Patient's name: Randy Blue  : 1950  Chief complaints (on day of admission): Loss of Consciousness ([place] [employer], family states syncope eposide, hit head on table. no blood thinners)  Admission date: 2021  Date of service: 2021   Room: [address] SURG  Primary care physician: Sharmila Kumar MD  Reason for visit: Follow-up for PE    Subjective  [place] was seen and examined at bedside     Doing very well   Orthosatic vitals negative today   Ambulate with O2 needs 2 L at home   Pulm to evaluate today   Discussed with nursing staff and case mgmt     Current treatment plan discussed and all questions answered     Current medications being prescribed discussed and patient expresses verbal understanding     Review of Systems  There are no new complaints of chest pain, shortness of breath, abdominal pain, nausea, vomiting, diarrhea, constipation unless otherwise mentioned above.      Hospital Medications  Current Facility-Administered Medications   Medication Dose Route Frequency Provider Last Rate Last Admin    sodium chloride flush 0.9 % injection 10 mL  10 mL IntraVENous 2 times per day Paras E Volino, DO   10 mL at 21 0836    sodium chloride flush 0.9 % injection 10 mL  10 mL IntraVENous PRN Paras E Volino, DO        0.9 % sodium chloride infusion  25 mL IntraVENous PRN Paras E Volino, DO        potassium chloride (KLOR-CON M) extended release tablet 40 mEq  40 mEq Oral PRN Paras E Volino, DO        Or    potassium bicarb-citric acid (EFFER-K) effervescent tablet 40 mEq  40 mEq Oral PRN Paras E Volino, DO        Or    potassium chloride 10 mEq/100 mL IVPB (Peripheral Line)  10 mEq IntraVENous PRN Paras E Volino, DO        enoxaparin (LOVENOX) injection 80 mg  1 mg/kg SubCUTAneous BID Paras E Volino, DO   80 mg at 21 0836    ondansetron (ZOFRAN-ODT) disintegrating tablet 4 mg  4 mg Oral Q8H PRN Paras E Volino, DO Or    ondansetron (ZOFRAN) injection 4 mg  4 mg IntraVENous Q6H PRN Paras Rachelino, DO        senna (SENOKOT) tablet 8.6 mg  1 tablet Oral Daily PRN Paras Garcia, DO        acetaminophen (TYLENOL) tablet 650 mg  650 mg Oral Q6H PRN Paras E Volino, DO   650 mg at 12/15/21 2052    Or    acetaminophen (TYLENOL) suppository 650 mg  650 mg Rectal Q6H PRN Paras Rachelino, DO        cyclobenzaprine (FLEXERIL) tablet 10 mg  10 mg Oral TID PRN Paras E Virginieino, DO        sertraline (ZOLOFT) tablet 100 mg  100 mg Oral Daily Paras E Volino, DO   100 mg at 12/16/21 0836    traZODone (DESYREL) tablet 50 mg  50 mg Oral Nightly Paras DOYLE Volino, DO   50 mg at 12/16/21 0002    pantoprazole (PROTONIX) tablet 40 mg  40 mg Oral QAM AC Paras Rachelino, DO   40 mg at 12/16/21 0614    famotidine (PEPCID) tablet 40 mg  40 mg Oral Nightly Paras Rachelino, DO   40 mg at 12/15/21 2052    DULoxetine (CYMBALTA) extended release capsule 30 mg  30 mg Oral Daily Sayra Russo MD   30 mg at 12/16/21 0836    perflutren lipid microspheres (DEFINITY) injection 1.65 mg  1.5 mL IntraVENous ONCE PRN Sayra Russo MD           PRN Medications  sodium chloride flush, sodium chloride, potassium chloride **OR** potassium alternative oral replacement **OR** potassium chloride, ondansetron **OR** ondansetron, senna, acetaminophen **OR** acetaminophen, cyclobenzaprine, perflutren lipid microspheres    Objective  Most Recent Recorded Vitals  /63   Pulse 98   Temp 97.3 °F (36.3 °C) (Oral)   Resp 18   Ht 5' 4\" (1.626 m)   Wt 187 lb 1.6 oz (84.9 kg)   SpO2 95%   BMI 32.12 kg/m²   No intake/output data recorded. No intake/output data recorded.     Physical Exam:  General: AAO to person/place/time/purpose, NAD, no labored breathing  Eyes: conjunctivae/corneas clear, sclera non icteric  Skin: color/texture/turgor normal, no rashes or lesions  Lungs: CTAB, no retractions/use of accessory muscles, no vocal fremitus, no rhonchi, no crackle, no rales  Heart: regular rate, regular rhythm, no murmur  Abdomen: soft, NT, bowel sounds normal  Extremities: atraumatic, no edema  Neurologic: cranial nerves 2-12 grossly intact, no slurred speech    Most Recent Labs  Lab Results   Component Value Date    WBC 2.5 (L) 12/16/2021    HGB 10.3 (L) 12/16/2021    HCT 35.6 12/16/2021     12/16/2021     12/16/2021    K 3.7 12/16/2021     12/16/2021    CREATININE 0.7 12/16/2021    BUN 8 12/16/2021    CO2 26 12/16/2021    GLUCOSE 115 (H) 12/16/2021    ALT 12 12/16/2021    AST 17 12/16/2021    INR 1.0 10/05/2010       NM LUNG VENT/PERFUSION (VQ)   Final Result   High probability for pulmonary embolus. The findings were sent to the Radiology Results [id number] at 10:59   am on 12/15/2021to be communicated to a licensed caregiver. RECOMMENDATIONS:   Unavailable         XR CHEST PORTABLE   Final Result   No acute process. Infusion catheter in place. CT Head WO Contrast   Final Result   No acute intracranial abnormality. RECOMMENDATIONS:   Unavailable             Echocardiogram   12/15/21  Summary   Normal left ventricular chamber size. Normal left ventricular systolic function. Visually estimated LVEF is greater than 60 %. No wall motion abnormalities. Mildly dilated right ventricle. Normal right ventricle systolic function. Normal tricuspid valve structure and function. Physiologic and/or trace tricuspid regurgitation. Pulmonic valve is structurally normal.   Trace pulmonic regurgitation present. Assessment   Active Hospital Problems    Diagnosis     Acute respiratory failure with hypoxia ([facility]) [J96.01]     Depression [F32. A]     Fibromyalgia [M79.7]     IBS (irritable bowel syndrome) [K58.9]     Sjogren's disease ([facility] [M35.00]          Plan  Suspected pulmonary embolus   ? Full dose lovenox - switch to Eliquis today per pulm   ? ECHO noted, no R heart strain present   ?  Allergic to IV contrast dye   ? VQ scan noted   ? Pulm has evaluated the patient   Syncope and collapse   ? Orthostatic hypotension - positive test on admit, now negative   ? Not on any home anti hypertensives  ? Continue to monitor BP    · PT AM-PAC-- 20/24  · Consults Pulm  · DVT prophylaxis Full dose Lovenox   · Code status Full  · Medications, labs and imaging reviewed   · Discharge plan: DC today     Electronically signed by Marco A Wu MD on 12/16/2021 at 9:17 AM    I can be reached through [facility].

## 2021-12-16 NOTE — CONSULTS
Swati Ramos M.D.,[facility]  Brissa Harris D.O., F.A.C.O.I., Claudeen Stallion, M.D. Avinash An M.D. Claudine Parker D.O. Patient:  Ronn Reese 70 y.o. female MRN: [id number]     Date of Service: 12/16/2021      PULMONARY CONSULTATION    Reason for Consultation: suspected PE  Referring Physician: Dr. Reny Babcock    Communication with the referring physician will be sent via the electronic medical record. Chief Complaint: syncope, shortness of breath    CODE STATUS: FULL    SUBJECTIVE:  HPI:  Ronn Reese is a 70 y.o. female not previously known to our practice. She denies any lung history including COPD, asthma, MASON, inhaler, or oxygen use. 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 She has a PMH: depression, breast cancer, Sjogren's disease, IBS, fibromyalgia. .     Patient presented to the emergency room after syncopal event at a restaurant. Increased weakness, head fogginess, shortness of breath in the past week. Pulse OX was 83% on room air at rest.  Ambulated patient on room air. Oxygen saturation was 81% on room air while ambulating  Oxygen applied. Recovery pulse ox 92% on 2L oxygen while ambulating    Admission labs: BMP within normal limits, proBNP 1680, troponin 24, WBC 3.8, D-dimer 4100, Covid negative  Admission imaging: Chest x-ray showed no acute process, infusion catheter in place, no effusion or pneumothorax. VQ scan showed high probability for pulmonary embolus. Patient seen and examined. Sitting in chair on 2L NC without distress. Patient states her breathing is much better. She denies shortness of breath, dizziness, chest pain, cough at this time. She states she has been weak, short of breath and has brain fogginess the past week. She denies any history of blood clots in the past.  Patient states she has 2 more chemo treatments before she receives a mastectomy.   Discussed with patient that she will need to be on Eliquis until her breast cancer is in remission. Patient stated understanding. Past Medical History:   Diagnosis Date    Depression     Fibromyalgia     IBS (irritable bowel syndrome)     [facility] ([facility] [address]     Thoracic compression fracture ([facility] [address]     T12       Past Surgical History:   Procedure Laterality Date    HYSTERECTOMY         History reviewed. No pertinent family history. Social History:   Social History     Socioeconomic History    Marital status:      Spouse name: Not on file    Number of children: Not on file    Years of education: Not on file    Highest education level: Not on file   Occupational History    Not on file   Tobacco Use    Smoking status: Never Smoker    Smokeless tobacco: Never Used   Vaping Use    Vaping Use: Never used   Substance and Sexual Activity    Alcohol use: Yes     Comment: social    Drug use: Never    Sexual activity: Not Currently     Partners: Male     Birth control/protection: Post-menopausal   Other Topics Concern    Not on file   Social History Narrative    Not on file     Social Determinants of Health     Financial Resource Strain:     Difficulty of Paying Living Expenses: Not on file   Food Insecurity:     Worried About Running Out of Food in the Last Year: Not on file    Kimani of Food in the Last Year: Not on file   Transportation Needs:     Lack of Transportation (Medical): Not on file    Lack of Transportation (Non-Medical):  Not on file   Physical Activity:     Days of Exercise per Week: Not on file    Minutes of Exercise per Session: Not on file   Stress:     Feeling of Stress : Not on file   Social Connections:     Frequency of Communication with Friends and Family: Not on file    Frequency of Social Gatherings with Friends and Family: Not on file    Attends Tenriism Services: Not on file    Active Member of Clubs or Organizations: Not on file    Attends Club or Organization Meetings: Not on file    Marital Status: Not on file   Intimate Partner Violence:     Fear of Current or Ex-Partner: Not on file    Emotionally Abused: Not on file    Physically Abused: Not on file    Sexually Abused: Not on file   Housing Stability:     Unable to Pay for Housing in the Last Year: Not on file    Number of Places Lived in the Last Year: Not on file    Unstable Housing in the Last Year: Not on file     Smoking history: The patient is a past smoker, unknown amount      ETOH:   reports current alcohol use. Exposures: There  is not history of TB or TB exposure. There is not asbestos or silica dust exposure. The patient reports does not have coal, foundry, quarry or [employer] exposure. Recent travel history none. There is not  history of recreational or IV drug use. There is not hot tub exposure. The patient does not have any exotic pets, turtles or exotic birds.  Previous RN    Vaccines:    Immunization History   Administered Date(s) Administered    COVID-19, Shakira Estimable, Primary or Immunocompromised, PF, 100mcg/0.5mL 02/04/2021, 03/04/2021        Home Meds: Medications Prior to Admission: DULoxetine (CYMBALTA) 30 MG extended release capsule, Take 1 capsule by mouth daily  ondansetron (ZOFRAN) 4 MG tablet, Take 4 mg by mouth every 8 hours as needed for Nausea or Vomiting  acetaminophen (TYLENOL) 500 MG tablet, Take 1,000 mg by mouth every 6 hours as needed for Pain  lansoprazole (PREVACID) 15 MG delayed release capsule, Take 15 mg by mouth daily  traZODone (DESYREL) 50 MG tablet, Take 50 mg by mouth nightly  ibuprofen (ADVIL) 200 MG tablet, Take 400 mg by mouth every 6 hours as needed for Pain  ranitidine (ZANTAC) 300 MG tablet, Take 300 mg by mouth nightly  meloxicam (MOBIC) 15 MG tablet, Take 1 tablet by mouth daily as needed for Pain  cyclobenzaprine (FLEXERIL) 10 MG tablet, Take 10 mg by mouth 3 times daily as needed for Muscle spasms  sertraline (ZOLOFT) 100 MG tablet, Take 100 mg by mouth daily    CURRENT MEDS :  Scheduled Meds:   sodium chloride flush  10 mL IntraVENous 2 times per day    enoxaparin  1 mg/kg SubCUTAneous BID    sertraline  100 mg Oral Daily    traZODone  50 mg Oral Nightly    pantoprazole  40 mg Oral QAM AC    famotidine  40 mg Oral Nightly    DULoxetine  30 mg Oral Daily       Continuous Infusions:   sodium chloride         Allergies   Allergen Reactions    Iodides Anaphylaxis       REVIEW OF SYSTEMS:  Constitutional: Denies fever, weight loss, night sweats, and fatigue  Skin: Denies pigmentation, dark lesions, and rashes   HEENT: Denies hearing loss, tinnitus, ear drainage, epistaxis, sore throat, and hoarseness. Cardiovascular: Denies palpitations, chest pain, and chest pressure. Respiratory: Denies cough, dyspnea at rest, hemoptysis, apnea, and choking. +MOISE  Gastrointestinal: Denies nausea, vomiting, poor appetite, diarrhea, heartburn or reflux  Genitourinary: Denies dysuria, frequency, urgency or hematuria  Musculoskeletal: Denies myalgias, muscle weakness, and bone pain + generalized weakness  Neurological: Denies dizziness, vertigo, headache, and focal weakness  Psychological: Denies anxiety and depression  Endocrine: Denies heat intolerance and cold intolerance  Hematopoietic/Lymphatic: Denies bleeding problems and blood transfusions    OBJECTIVE:   /63   Pulse 98   Temp 97.3 °F (36.3 °C) (Oral)   Resp 18   Ht 5' 4\" (1.626 m)   Wt 187 lb 1.6 oz (84.9 kg)   SpO2 95%   BMI 32.12 kg/m²   SpO2 Readings from Last 1 Encounters:   12/16/21 95%        I/O:    Intake/Output Summary (Last 24 hours) at 12/16/2021 1444  Last data filed at 12/16/2021 1334  Gross per 24 hour   Intake 240 ml   Output --   Net 240 ml     Vent Information  SpO2: 95 %      Physical Exam:  General: The patient is lying in bed comfortably without any distress.   Breathing is not labored  HEENT: Pupils are equal round and reactive to light, there are no oral lesions and no post-nasal drip   Neck: supple without adenopathy  Cardiovascular: regular rate and rhythm without murmur or gallop  Respiratory: Clear to auscultation bilaterally without wheezing or crackles. Air entry is symmetric  Abdomen: soft, non-tender, non-distended, normal bowel sounds  Extremities: warm, no edema, no clubbing  Skin: no rash or lesion  Neurologic: CN II-XII grossly intact, no focal deficits    Imaging personally reviewed:  CXR 12/14  The lungs are without acute focal process.  There is no effusion or   pneumothorax. The cardiomediastinal silhouette is without acute process. The   osseous structures are without acute process. VQ scan 12/15  High probability for pulmonary embolus. Echo 12/15/21   Normal left ventricular chamber size. Normal left ventricular systolic function. Visually estimated LVEF is greater than 60 %. No wall motion abnormalities. Mildly dilated right ventricle. Normal right ventricle systolic function. Normal tricuspid valve structure and function. Physiologic and/or trace tricuspid regurgitation. Pulmonic valve is structurally normal.   Trace pulmonic regurgitation present.       Signature      ----------------------------------------------------------------   Electronically signed by Chey Anna MD(Interpreting   physician) on 12/16/2021 01:52 PM      Labs:  Lab Results   Component Value Date    WBC 2.5 12/16/2021    HGB 10.3 12/16/2021    HCT 35.6 12/16/2021    .9 12/16/2021    MCH 29.8 12/16/2021    MCHC 28.9 12/16/2021    RDW 15.7 12/16/2021     12/16/2021    MPV 10.9 12/16/2021     Lab Results   Component Value Date     12/16/2021    K 3.7 12/16/2021     12/16/2021    CO2 26 12/16/2021    BUN 8 12/16/2021    CREATININE 0.7 12/16/2021    LABALBU 3.6 12/16/2021    LABALBU 4.5 10/05/2010    CALCIUM 8.5 12/16/2021    GFRAA >60 12/16/2021    LABGLOM >60 12/16/2021     Lab Results   Component Value Date    PROTIME 11.1 10/05/2010    INR 1.0 10/05/2010     Recent Labs     12/15/21  0024   PROBNP 1,966*     No results for input(s): TROPONINI in the last 72 hours. No results for input(s): PROCAL in the last 72 hours. This SmartLink has not been configured with any valid records. Micro:  12/14 COVID (-)    Assessment:  1. High probability for pulmonary embolism  2. Breast cancer, receiving chemo  3. Sjogren's disease  4. Fibromyalgia  5. Hypertension    Plan:  1. Per ambulatory pulse ox, patient requires 2L NC.  2. Echo reviewed, no RV strain noted, mildly dilated right ventricle  3. Discontinue Lovenox. Start Eliquis tonight 10 mg twice daily x7 days then 5 mg twice daily for 3 months   4. Okay to discharge from pulmonary point of view, follow-up message routed to office      Thank you for allowing me to participate in the care of Ashwin Mora. Please feel free to call with questions. This plan of care was reviewed in collaboration with Dr. Lonnie Gomez    Electronically signed by GAVIN Mercado CNP on 12/16/2021 at 2:44 PM      Note: This report was completed utilizing computer voice recognition software. Every effort has been made to ensure accuracy, however; inadvertent computerized transcription errors may be present      I personally saw, examined, and cared for the patient. Labs, medications, radiographs reviewed. I agree with history exam and plans detailed in NP note with the following additions:    Asked to see Ms. Jerald Hidalgo for a V/Q scan with high probability for PE in the setting of 1 week of dyspnea and an episode of syncope. She has breast cancer and is undergoing chemotherapy. She cannot have a CTA d/t anaphylaxis with iodine. Echo with mild RV dilation, normal function. She will need supplemental oxygen for d/c. Would change to eliquis and treat for at least 3 months depending on her cancer status. F/u with NP in 2 weeks and me in 3 months with a f/u echo. She is okay for d/c.     Electronically signed by Magdalene Moss MD on 12/16/2021 at 10:19 PM

## 2021-12-16 NOTE — CARE COORDINATION
Social work / Discharge planning:       RN updated social work that patient will need oxygen for discharge. CM informed social work that patient has no preference for DME provider per their conversation. Referral made to [address]. Patient will need to wait for delivery of portable oxygen for discharge.   Electronically signed by LAWRENCE Huber on 12/16/2021 at 11:03 AM

## 2022-04-06 ENCOUNTER — HOSPITAL ENCOUNTER (OUTPATIENT)
Age: 72
Discharge: HOME | End: 2022-04-08

## 2022-04-06 PROCEDURE — 88305 TISSUE EXAM BY PATHOLOGIST: CPT

## 2022-04-06 PROCEDURE — 88341 IMHCHEM/IMCYTCHM EA ADD ANTB: CPT

## 2022-04-06 PROCEDURE — 88342 IMHCHEM/IMCYTCHM 1ST ANTB: CPT

## 2022-04-06 PROCEDURE — 88307 TISSUE EXAM BY PATHOLOGIST: CPT

## 2022-04-06 PROCEDURE — 88360 TUMOR IMMUNOHISTOCHEM/MANUAL: CPT

## 2022-04-07 ENCOUNTER — HOSPITAL ENCOUNTER (OUTPATIENT)
Age: 72
Discharge: HOME | End: 2022-04-09

## 2022-04-07 LAB
ANION GAP SERPL CALCULATED.3IONS-SCNC: 10 MMOL/L (ref 7–16)
BUN BLDV-MCNC: 11 MG/DL (ref 6–23)
CALCIUM SERPL-MCNC: 8.5 MG/DL (ref 8.6–10.2)
CHLORIDE BLD-SCNC: 106 MMOL/L (ref 98–107)
CO2: 26 MMOL/L (ref 22–29)
CREAT SERPL-MCNC: 0.6 MG/DL (ref 0.5–1)
GFR AFRICAN AMERICAN: >60
GFR NON-AFRICAN AMERICAN: >60 ML/MIN/1.73
GLUCOSE BLD-MCNC: 147 MG/DL (ref 74–99)
HCT VFR BLD CALC: 34.3 % (ref 34–48)
HEMOGLOBIN: 10.6 G/DL (ref 11.5–15.5)
MCH RBC QN AUTO: 27.5 PG (ref 26–35)
MCHC RBC AUTO-ENTMCNC: 30.9 % (ref 32–34.5)
MCV RBC AUTO: 89.1 FL (ref 80–99.9)
PDW BLD-RTO: 14.3 FL (ref 11.5–15)
PLATELET # BLD: 214 E9/L (ref 130–450)
PMV BLD AUTO: 10.7 FL (ref 7–12)
POTASSIUM SERPL-SCNC: 3.5 MMOL/L (ref 3.5–5)
RBC # BLD: 3.85 E12/L (ref 3.5–5.5)
SODIUM BLD-SCNC: 142 MMOL/L (ref 132–146)
WBC # BLD: 9.8 E9/L (ref 4.5–11.5)

## 2022-04-07 PROCEDURE — 85027 COMPLETE CBC AUTOMATED: CPT

## 2022-04-07 PROCEDURE — 80048 BASIC METABOLIC PNL TOTAL CA: CPT

## 2022-06-08 PROBLEM — H81.10 BENIGN PAROXYSMAL POSITIONAL VERTIGO: Status: ACTIVE | Noted: 2022-06-08

## 2022-06-08 PROBLEM — R23.3 BRUISES EASILY: Status: ACTIVE | Noted: 2022-06-08

## 2022-06-08 PROBLEM — M62.9 DISORDER OF MUSCLE: Status: ACTIVE | Noted: 2021-12-15

## 2022-06-08 PROBLEM — D64.9 ANEMIA: Status: ACTIVE | Noted: 2022-06-08

## 2022-06-08 PROBLEM — R26.9 ABNORMAL GAIT: Status: ACTIVE | Noted: 2022-06-08

## 2022-06-08 PROBLEM — M19.90 ARTHRITIS: Status: ACTIVE | Noted: 2022-06-08

## 2022-06-08 PROBLEM — F41.9 ANXIETY: Status: ACTIVE | Noted: 2022-06-08

## 2022-06-08 PROBLEM — T45.1X5A CHEMOTHERAPY-INDUCED NAUSEA AND VOMITING: Status: ACTIVE | Noted: 2022-06-08

## 2022-06-08 PROBLEM — R11.2 CHEMOTHERAPY-INDUCED NAUSEA AND VOMITING: Status: ACTIVE | Noted: 2022-06-08

## 2023-04-18 ENCOUNTER — HOSPITAL ENCOUNTER (OUTPATIENT)
Age: 73
Discharge: HOME | End: 2023-04-20

## 2023-04-18 PROCEDURE — 88305 TISSUE EXAM BY PATHOLOGIST: CPT
